# Patient Record
Sex: FEMALE | Race: OTHER | URBAN - METROPOLITAN AREA
[De-identification: names, ages, dates, MRNs, and addresses within clinical notes are randomized per-mention and may not be internally consistent; named-entity substitution may affect disease eponyms.]

---

## 2017-12-25 ENCOUNTER — EMERGENCY (EMERGENCY)
Facility: HOSPITAL | Age: 67
LOS: 1 days | Discharge: HOME | End: 2017-12-25
Attending: EMERGENCY MEDICINE | Admitting: EMERGENCY MEDICINE
Payer: SELF-PAY

## 2017-12-25 VITALS
WEIGHT: 207.01 LBS | OXYGEN SATURATION: 99 % | RESPIRATION RATE: 18 BRPM | HEIGHT: 65 IN | HEART RATE: 83 BPM | SYSTOLIC BLOOD PRESSURE: 162 MMHG | TEMPERATURE: 98 F | DIASTOLIC BLOOD PRESSURE: 79 MMHG

## 2017-12-25 LAB
ALBUMIN SERPL ELPH-MCNC: 4.6 G/DL — SIGNIFICANT CHANGE UP (ref 3.3–5.2)
ALP SERPL-CCNC: 96 U/L — SIGNIFICANT CHANGE UP (ref 40–120)
ALT FLD-CCNC: 38 U/L — HIGH
ANION GAP SERPL CALC-SCNC: 16 MMOL/L — SIGNIFICANT CHANGE UP (ref 5–17)
AST SERPL-CCNC: 26 U/L — SIGNIFICANT CHANGE UP
BASOPHILS # BLD AUTO: 0 K/UL — SIGNIFICANT CHANGE UP (ref 0–0.2)
BASOPHILS NFR BLD AUTO: 0.1 % — SIGNIFICANT CHANGE UP (ref 0–2)
BILIRUB SERPL-MCNC: 0.2 MG/DL — LOW (ref 0.4–2)
BUN SERPL-MCNC: 28 MG/DL — HIGH (ref 8–20)
CALCIUM SERPL-MCNC: 10.3 MG/DL — HIGH (ref 8.6–10.2)
CHLORIDE SERPL-SCNC: 96 MMOL/L — LOW (ref 98–107)
CK MB CFR SERPL CALC: 3.7 NG/ML — SIGNIFICANT CHANGE UP (ref 0–6.7)
CK SERPL-CCNC: 199 U/L — HIGH (ref 25–170)
CO2 SERPL-SCNC: 27 MMOL/L — SIGNIFICANT CHANGE UP (ref 22–29)
CREAT SERPL-MCNC: 1.35 MG/DL — HIGH (ref 0.5–1.3)
EOSINOPHIL # BLD AUTO: 0.1 K/UL — SIGNIFICANT CHANGE UP (ref 0–0.5)
EOSINOPHIL NFR BLD AUTO: 1.9 % — SIGNIFICANT CHANGE UP (ref 0–6)
GLUCOSE SERPL-MCNC: 195 MG/DL — HIGH (ref 70–115)
HCT VFR BLD CALC: 39.1 % — SIGNIFICANT CHANGE UP (ref 37–47)
HGB BLD-MCNC: 13.3 G/DL — SIGNIFICANT CHANGE UP (ref 12–16)
LYMPHOCYTES # BLD AUTO: 2.4 K/UL — SIGNIFICANT CHANGE UP (ref 1–4.8)
LYMPHOCYTES # BLD AUTO: 35.6 % — SIGNIFICANT CHANGE UP (ref 20–55)
MCHC RBC-ENTMCNC: 29 PG — SIGNIFICANT CHANGE UP (ref 27–31)
MCHC RBC-ENTMCNC: 34 G/DL — SIGNIFICANT CHANGE UP (ref 32–36)
MCV RBC AUTO: 85.4 FL — SIGNIFICANT CHANGE UP (ref 81–99)
MONOCYTES # BLD AUTO: 0.6 K/UL — SIGNIFICANT CHANGE UP (ref 0–0.8)
MONOCYTES NFR BLD AUTO: 8.6 % — SIGNIFICANT CHANGE UP (ref 3–10)
NEUTROPHILS # BLD AUTO: 3.7 K/UL — SIGNIFICANT CHANGE UP (ref 1.8–8)
NEUTROPHILS NFR BLD AUTO: 53.7 % — SIGNIFICANT CHANGE UP (ref 37–73)
PLATELET # BLD AUTO: 344 K/UL — SIGNIFICANT CHANGE UP (ref 150–400)
POTASSIUM SERPL-MCNC: 4.2 MMOL/L — SIGNIFICANT CHANGE UP (ref 3.5–5.3)
POTASSIUM SERPL-SCNC: 4.2 MMOL/L — SIGNIFICANT CHANGE UP (ref 3.5–5.3)
PROT SERPL-MCNC: 8.1 G/DL — SIGNIFICANT CHANGE UP (ref 6.6–8.7)
RBC # BLD: 4.58 M/UL — SIGNIFICANT CHANGE UP (ref 4.4–5.2)
RBC # FLD: 12.6 % — SIGNIFICANT CHANGE UP (ref 11–15.6)
SODIUM SERPL-SCNC: 139 MMOL/L — SIGNIFICANT CHANGE UP (ref 135–145)
T4 AB SER-ACNC: 7.5 UG/DL — SIGNIFICANT CHANGE UP (ref 4.5–12)
TROPONIN T SERPL-MCNC: <0.01 NG/ML — SIGNIFICANT CHANGE UP (ref 0–0.06)
TSH SERPL-MCNC: 2.16 UIU/ML — SIGNIFICANT CHANGE UP (ref 0.27–4.2)
WBC # BLD: 6.9 K/UL — SIGNIFICANT CHANGE UP (ref 4.8–10.8)
WBC # FLD AUTO: 6.9 K/UL — SIGNIFICANT CHANGE UP (ref 4.8–10.8)

## 2017-12-25 PROCEDURE — 84436 ASSAY OF TOTAL THYROXINE: CPT

## 2017-12-25 PROCEDURE — 99284 EMERGENCY DEPT VISIT MOD MDM: CPT | Mod: 25

## 2017-12-25 PROCEDURE — 93010 ELECTROCARDIOGRAM REPORT: CPT

## 2017-12-25 PROCEDURE — 85027 COMPLETE CBC AUTOMATED: CPT

## 2017-12-25 PROCEDURE — 82553 CREATINE MB FRACTION: CPT

## 2017-12-25 PROCEDURE — 84484 ASSAY OF TROPONIN QUANT: CPT

## 2017-12-25 PROCEDURE — 71046 X-RAY EXAM CHEST 2 VIEWS: CPT

## 2017-12-25 PROCEDURE — 93005 ELECTROCARDIOGRAM TRACING: CPT

## 2017-12-25 PROCEDURE — 71020: CPT | Mod: 26

## 2017-12-25 PROCEDURE — 82550 ASSAY OF CK (CPK): CPT

## 2017-12-25 PROCEDURE — 70450 CT HEAD/BRAIN W/O DYE: CPT

## 2017-12-25 PROCEDURE — 36415 COLL VENOUS BLD VENIPUNCTURE: CPT

## 2017-12-25 PROCEDURE — 70450 CT HEAD/BRAIN W/O DYE: CPT | Mod: 26

## 2017-12-25 PROCEDURE — 99285 EMERGENCY DEPT VISIT HI MDM: CPT

## 2017-12-25 PROCEDURE — 80053 COMPREHEN METABOLIC PANEL: CPT

## 2017-12-25 PROCEDURE — 84443 ASSAY THYROID STIM HORMONE: CPT

## 2017-12-25 NOTE — ED PROVIDER NOTE - NEUROLOGICAL, MLM
Alert and oriented, no focal deficits, no motor or sensory deficits. Full sensory to light touch, muscle strength 5/5 CN II-XII intact.

## 2017-12-25 NOTE — ED ADULT NURSE NOTE - OBJECTIVE STATEMENT
Assumed pt care @ 2130. Pt received sitting on stretcher in NAD. Pt AOx3 C/O left arm numbness and lower lip numbness that started @ 0730 and lasted for about 10 mins. Pt reports taking an asa unsure if it was a baby or full asa. Pt was seen @ urgent care @ 0900 and told to come fo CT but just came now.  Sensory equal B/L. Pt has a h/o DM and HTN. Lungs CTA, RR even unlabored. Skin warm, dry, color appropriate for age and race.

## 2017-12-25 NOTE — ED PROVIDER NOTE - OBJECTIVE STATEMENT
This is a 66 year old female with PMHx HTN, DM presenting to the ED c/o numbness to LUE and L lower lip today. Pt states she experienced her sx around 0730, sx subsided after ~10 minutes. She reports taking ASA 2-3 minutes before her sx resolved. Pt was evaluated at urgent care ~0900 today, instructed to seek ED evaluation r/o stroke however pt returned home to spend [place] with her family. Denies weakness, headache, chest pain, stroke, fever, chills. Non-smoker, no EtOH use. No further complaints at this time.

## 2017-12-25 NOTE — ED PROVIDER NOTE - PROGRESS NOTE DETAILS
CT results and labs as noted and reviewed with pt.  Pt stable for d/c with Neuro f/u as outpt.  Instructions and precautions reviewed

## 2017-12-25 NOTE — ED STATDOCS - PROGRESS NOTE DETAILS
67 y/o F pt with a hx of DM and HTN presents to the ED with c/o medical evaluation. Pt is experiencing numbness in her left arm and lip. She went to urgent care this morning (7:30 am) and was sent to the ED for possible stroke. She went home before coming to ED. Pt is currently taking Invokana, amlodipine, and other medications she can't recall. Pt states the numbness lasted a few minutes and she took an aspirin with relief. She states her sx have subsided but still "funny feeling in arm". Denies CP, SOB, HA. Focused eval, protocol orders entered. Pt to be moved to main ED for complete evaluation by another provider.

## 2019-05-25 PROBLEM — E11.9 TYPE 2 DIABETES MELLITUS WITHOUT COMPLICATIONS: Chronic | Status: ACTIVE | Noted: 2017-12-25

## 2019-05-25 PROBLEM — I10 ESSENTIAL (PRIMARY) HYPERTENSION: Chronic | Status: ACTIVE | Noted: 2017-12-25

## 2019-06-01 ENCOUNTER — OUTPATIENT (OUTPATIENT)
Dept: OUTPATIENT SERVICES | Facility: HOSPITAL | Age: 69
LOS: 1 days | End: 2019-06-01
Payer: MEDICARE

## 2019-06-01 ENCOUNTER — APPOINTMENT (OUTPATIENT)
Dept: ULTRASOUND IMAGING | Facility: CLINIC | Age: 69
End: 2019-06-01
Payer: MEDICARE

## 2019-06-01 DIAGNOSIS — Z00.00 ENCOUNTER FOR GENERAL ADULT MEDICAL EXAMINATION WITHOUT ABNORMAL FINDINGS: ICD-10-CM

## 2019-06-01 PROCEDURE — 76536 US EXAM OF HEAD AND NECK: CPT

## 2019-06-01 PROCEDURE — 76536 US EXAM OF HEAD AND NECK: CPT | Mod: 26

## 2019-08-27 ENCOUNTER — APPOINTMENT (OUTPATIENT)
Dept: ENDOCRINOLOGY | Facility: CLINIC | Age: 69
End: 2019-08-27
Payer: MEDICARE

## 2019-08-27 VITALS
DIASTOLIC BLOOD PRESSURE: 72 MMHG | WEIGHT: 196 LBS | SYSTOLIC BLOOD PRESSURE: 140 MMHG | HEART RATE: 75 BPM | BODY MASS INDEX: 32.65 KG/M2 | HEIGHT: 65 IN

## 2019-08-27 DIAGNOSIS — Z86.39 PERSONAL HISTORY OF OTHER ENDOCRINE, NUTRITIONAL AND METABOLIC DISEASE: ICD-10-CM

## 2019-08-27 DIAGNOSIS — E55.9 VITAMIN D DEFICIENCY, UNSPECIFIED: ICD-10-CM

## 2019-08-27 DIAGNOSIS — Z87.19 PERSONAL HISTORY OF OTHER DISEASES OF THE DIGESTIVE SYSTEM: ICD-10-CM

## 2019-08-27 DIAGNOSIS — Z78.9 OTHER SPECIFIED HEALTH STATUS: ICD-10-CM

## 2019-08-27 DIAGNOSIS — Z86.79 PERSONAL HISTORY OF OTHER DISEASES OF THE CIRCULATORY SYSTEM: ICD-10-CM

## 2019-08-27 DIAGNOSIS — Z87.39 PERSONAL HISTORY OF OTHER DISEASES OF THE MUSCULOSKELETAL SYSTEM AND CONNECTIVE TISSUE: ICD-10-CM

## 2019-08-27 PROCEDURE — 99205 OFFICE O/P NEW HI 60 MIN: CPT

## 2019-08-27 RX ORDER — AMLODIPINE BESYLATE 5 MG/1
5 TABLET ORAL
Refills: 0 | Status: ACTIVE | COMMUNITY

## 2019-08-27 RX ORDER — ATORVASTATIN CALCIUM 20 MG/1
20 TABLET, FILM COATED ORAL
Refills: 0 | Status: ACTIVE | COMMUNITY

## 2019-08-27 RX ORDER — ASPIRIN 81 MG
81 TABLET, DELAYED RELEASE (ENTERIC COATED) ORAL
Refills: 0 | Status: ACTIVE | COMMUNITY

## 2019-08-27 RX ORDER — LOSARTAN POTASSIUM AND HYDROCHLOROTHIAZIDE 25; 100 MG/1; MG/1
100-25 TABLET ORAL
Refills: 0 | Status: ACTIVE | COMMUNITY

## 2019-08-27 NOTE — ASSESSMENT
[Carbohydrate Consistent Diet] : carbohydrate consistent diet [Diabetes Foot Care] : diabetes foot care [Long Term Vascular Complications] : long term vascular complications of diabetes [Importance of Diet and Exercise] : importance of diet and exercise to improve glycemic control, achieve weight loss and improve cardiovascular health [Insulin Self-Administration] : insulin self-administration [Self Monitoring of Blood Glucose] : self monitoring of blood glucose [FreeTextEntry1] : DM2 poorly controlled associated with HTN, HDL obesity and NTMNG. She seems to have significant memory impairment. She had falls . She feels " the brain does not communicate with her body". She does not check any bgs at home and she does not take the full doses of medication. \par \par DM2 : suboptimal control. \par increase metformin 500 mg BID \par continue glimepiride 4 mg qd \par glucometer, teaching, supplies. \par start checking bgs 2x day \par check microalb spot \par continue  ARB \par all lab results reviewed and discussed with patient. All questions answered\par discussed diet and exercise\par encouraged more exercise walking 30 min 3 x week\par Discussed complications of diabetes at length including MI/CVA/ESRD/dialysis/blindness/amputations/infections\par See CDE for diet teaching\par Needs to try to have more protein for meals\par eye exam referral \par fax me logbook in  2 week \par for this memory impairment she needs to see neurology to rule out TIA / CVA \par foot exam normal\par CKD with GFR 52. \par \par NTMNG : \par US report revised and results discussed with patient. \par Images reviewed and compared to prior study.\par had FNA benign JUne 2019 \par monitor nodules for growth with US in 6 mos \par \par HTN :  bp at target on meds. Continue current management.\par Discussed and advised low salt diet. \par continue current meds\par \par HLD: check lipids in 3 mos\par continue statin \par \par obesity: \par discussed diet and exercise\par encouraged more exercise walking 30 min 3 x week\par Discussed complications of diabetes at length including MI/CVA/ESRD/dialysis/blindness/amputations/infections\par Needs to try to have more protein for meals\par \par hypercalcemia: repeat CMp, ca ionized., PTHi, 25OHD, 1,25 diOHD \par will call with results. \par she takes HCTZ . she does not take any MVI

## 2019-08-27 NOTE — PHYSICAL EXAM
[Alert] : alert [No Acute Distress] : no acute distress [Well Nourished] : well nourished [Well Developed] : well developed [Normal Sclera/Conjunctiva] : normal sclera/conjunctiva [No Proptosis] : no proptosis [EOMI] : extra ocular movement intact [Normal Oropharynx] : the oropharynx was normal [Thyroid Not Enlarged] : the thyroid was not enlarged [No Thyroid Nodules] : there were no palpable thyroid nodules [No Respiratory Distress] : no respiratory distress [No Accessory Muscle Use] : no accessory muscle use [Clear to Auscultation] : lungs were clear to auscultation bilaterally [Normal Rate] : heart rate was normal  [Normal S1, S2] : normal S1 and S2 [Regular Rhythm] : with a regular rhythm [Pedal Pulses Normal] : the pedal pulses are present [No Edema] : there was no peripheral edema [Normal Bowel Sounds] : normal bowel sounds [Not Tender] : non-tender [Soft] : abdomen soft [Not Distended] : not distended [Post Cervical Nodes] : posterior cervical nodes [Anterior Cervical Nodes] : anterior cervical nodes [Axillary Nodes] : axillary nodes [Normal] : normal and non tender [No Spinal Tenderness] : no spinal tenderness [No Stigmata of Cushings Syndrome] : no stigmata of cushings syndrome [Spine Straight] : spine straight [Normal Gait] : normal gait [No Rash] : no rash [Normal Strength/Tone] : muscle strength and tone were normal [No Tremors] : no tremors [Normal Reflexes] : deep tendon reflexes were 2+ and symmetric [Oriented x3] : oriented to person, place, and time [Acanthosis Nigricans] : no acanthosis nigricans

## 2019-08-28 LAB
24R-OH-CALCIDIOL SERPL-MCNC: 34.6 PG/ML
ALBUMIN SERPL ELPH-MCNC: 4.7 G/DL
ALP BLD-CCNC: 79 U/L
ALT SERPL-CCNC: 26 U/L
ANION GAP SERPL CALC-SCNC: 14 MMOL/L
AST SERPL-CCNC: 21 U/L
BILIRUB SERPL-MCNC: 0.2 MG/DL
BUN SERPL-MCNC: 33 MG/DL
CA-I SERPL-SCNC: 1.34 MMOL/L
CALCIUM SERPL-MCNC: 10.8 MG/DL
CALCIUM SERPL-MCNC: 10.8 MG/DL
CHLORIDE SERPL-SCNC: 103 MMOL/L
CO2 SERPL-SCNC: 24 MMOL/L
CREAT SERPL-MCNC: 1.28 MG/DL
GLUCOSE SERPL-MCNC: 90 MG/DL
PARATHYROID HORMONE INTACT: 76 PG/ML
PHOSPHATE SERPL-MCNC: 3.6 MG/DL
POTASSIUM SERPL-SCNC: 4.4 MMOL/L
PROT SERPL-MCNC: 7.5 G/DL
SODIUM SERPL-SCNC: 141 MMOL/L

## 2019-09-03 ENCOUNTER — RX RENEWAL (OUTPATIENT)
Age: 69
End: 2019-09-03

## 2019-09-05 ENCOUNTER — MEDICATION RENEWAL (OUTPATIENT)
Age: 69
End: 2019-09-05

## 2019-09-05 RX ORDER — BLOOD-GLUCOSE METER
W/DEVICE KIT MISCELLANEOUS
Qty: 1 | Refills: 0 | Status: ACTIVE | COMMUNITY
Start: 2019-09-03 | End: 1900-01-01

## 2019-09-05 RX ORDER — LANCETS
EACH MISCELLANEOUS
Qty: 200 | Refills: 0 | Status: ACTIVE | COMMUNITY
Start: 2019-09-05 | End: 1900-01-01

## 2019-09-05 RX ORDER — BLOOD SUGAR DIAGNOSTIC
STRIP MISCELLANEOUS
Qty: 2 | Refills: 0 | Status: ACTIVE | COMMUNITY
Start: 2019-09-03 | End: 1900-01-01

## 2019-10-15 ENCOUNTER — APPOINTMENT (OUTPATIENT)
Dept: ENDOCRINOLOGY | Facility: CLINIC | Age: 69
End: 2019-10-15
Payer: MEDICARE

## 2019-10-15 PROCEDURE — 97802 MEDICAL NUTRITION INDIV IN: CPT

## 2019-10-16 LAB — HBA1C MFR BLD HPLC: 6

## 2019-12-10 ENCOUNTER — APPOINTMENT (OUTPATIENT)
Dept: ENDOCRINOLOGY | Facility: CLINIC | Age: 69
End: 2019-12-10

## 2020-03-16 ENCOUNTER — APPOINTMENT (OUTPATIENT)
Dept: ENDOCRINOLOGY | Facility: CLINIC | Age: 70
End: 2020-03-16
Payer: MEDICARE

## 2020-03-16 VITALS
DIASTOLIC BLOOD PRESSURE: 78 MMHG | WEIGHT: 179 LBS | SYSTOLIC BLOOD PRESSURE: 150 MMHG | BODY MASS INDEX: 29.82 KG/M2 | HEIGHT: 65 IN

## 2020-03-16 DIAGNOSIS — E78.00 PURE HYPERCHOLESTEROLEMIA, UNSPECIFIED: ICD-10-CM

## 2020-03-16 DIAGNOSIS — E11.65 TYPE 2 DIABETES MELLITUS WITH HYPERGLYCEMIA: ICD-10-CM

## 2020-03-16 DIAGNOSIS — E21.3 HYPERPARATHYROIDISM, UNSPECIFIED: ICD-10-CM

## 2020-03-16 DIAGNOSIS — Z79.84 LONG TERM (CURRENT) USE OF ORAL HYPOGLYCEMIC DRUGS: ICD-10-CM

## 2020-03-16 DIAGNOSIS — I10 ESSENTIAL (PRIMARY) HYPERTENSION: ICD-10-CM

## 2020-03-16 DIAGNOSIS — E04.2 NONTOXIC MULTINODULAR GOITER: ICD-10-CM

## 2020-03-16 DIAGNOSIS — E66.9 OBESITY, UNSPECIFIED: ICD-10-CM

## 2020-03-16 DIAGNOSIS — E83.52 HYPERCALCEMIA: ICD-10-CM

## 2020-03-16 LAB — HBA1C MFR BLD HPLC: 6.5

## 2020-03-16 PROCEDURE — 83036 HEMOGLOBIN GLYCOSYLATED A1C: CPT | Mod: QW

## 2020-03-16 PROCEDURE — 99214 OFFICE O/P EST MOD 30 MIN: CPT | Mod: 25

## 2020-03-16 RX ORDER — HYDROCHLOROTHIAZIDE 25 MG/1
25 TABLET ORAL
Refills: 0 | Status: ACTIVE | COMMUNITY

## 2020-03-16 RX ORDER — GLIMEPIRIDE 4 MG/1
4 TABLET ORAL
Refills: 0 | Status: DISCONTINUED | COMMUNITY
End: 2020-03-16

## 2020-03-16 RX ORDER — AMLODIPINE BESYLATE 5 MG/1
5 TABLET ORAL
Refills: 0 | Status: DISCONTINUED | COMMUNITY
End: 2020-03-16

## 2020-03-16 RX ORDER — METFORMIN HYDROCHLORIDE 500 MG/1
500 TABLET, COATED ORAL
Refills: 0 | Status: ACTIVE | COMMUNITY

## 2020-03-16 NOTE — PHYSICAL EXAM
[Alert] : alert [No Acute Distress] : no acute distress [Well Nourished] : well nourished [Well Developed] : well developed [Normal Sclera/Conjunctiva] : normal sclera/conjunctiva [EOMI] : extra ocular movement intact [No Proptosis] : no proptosis [Normal Oropharynx] : the oropharynx was normal [Thyroid Not Enlarged] : the thyroid was not enlarged [No Thyroid Nodules] : there were no palpable thyroid nodules [No Respiratory Distress] : no respiratory distress [No Accessory Muscle Use] : no accessory muscle use [Clear to Auscultation] : lungs were clear to auscultation bilaterally [Normal Rate] : heart rate was normal  [Normal S1, S2] : normal S1 and S2 [Regular Rhythm] : with a regular rhythm [Pedal Pulses Normal] : the pedal pulses are present [No Edema] : there was no peripheral edema [Normal Bowel Sounds] : normal bowel sounds [Not Tender] : non-tender [Soft] : abdomen soft [Not Distended] : not distended [Post Cervical Nodes] : posterior cervical nodes [Anterior Cervical Nodes] : anterior cervical nodes [Axillary Nodes] : axillary nodes [No Spinal Tenderness] : no spinal tenderness [Spine Straight] : spine straight [No Stigmata of Cushings Syndrome] : no stigmata of cushings syndrome [Normal Gait] : normal gait [Normal Strength/Tone] : muscle strength and tone were normal [No Rash] : no rash [Normal Reflexes] : deep tendon reflexes were 2+ and symmetric [No Tremors] : no tremors [Oriented x3] : oriented to person, place, and time [Normal] : normal [Full ROM] : with full range of motion [Acanthosis Nigricans] : no acanthosis nigricans [Diminished Throughout Both Feet] : normal tactile sensation with monofilament testing throughout both feet

## 2020-03-16 NOTE — ASSESSMENT
[Carbohydrate Consistent Diet] : carbohydrate consistent diet [Importance of Diet and Exercise] : importance of diet and exercise to improve glycemic control, achieve weight loss and improve cardiovascular health [Self Monitoring of Blood Glucose] : self monitoring of blood glucose [FreeTextEntry1] : DM2 in patient with  HTN, HDL obesity and NTMNG. She seems to have significant memory impairment.  \par bgs am 115-120.   A1c 6.5\par \par DM2 : better controlled \par continue metformin 500 mg BID \par continue glimepiride 4 mg qd \par checking bgs 2x day \par microalb spot normal. \par all lab results reviewed and discussed with patient. All questions answered\par discussed diet and exercise\par e just had meniscal repair and she does not exercise \par encouraged to start walking.\par Discussed complications of diabetes at length including MI/CVA/ESRD/dialysis/blindness/amputations/infections\par eye exam referral again \par for this memory impairment she had neurology evaluation and she had CVA in the past.\par CKD with GFR 56. \par continue ARB \par feet exam normal vibratory and monofilament. \par \par NTMNG : \par Images reviewed and compared to prior study.\par had FNA benign JUne 2019 . repeat US now to evaluate nodules for growth.  \par \par HTN :  BP slightly high today. BUt she forgot to take all meds today. Strongly advised to take all of them daily. \par I advised low fat/low cholesterol diet, low salt diet, and weight loss\par continue current meds\par \par HLD: LDL at target.\par low fat/low cholesterol diet and weight loss advised\par continue statin \par \par obesity: \par encouraged more exercise walking 30 min 3 x week\par needs to try to have more protein for meals\par \par hypercalcemia  PTH-driven  \par Corrected calcium 10.1.. \par PTH i high 76. \par she takes HCTZ . Advised to call pCP and stop HCTZ and adjust BP medication. \par will repeat Calcium 4 weeks after stopping hctz

## 2020-06-10 ENCOUNTER — LABORATORY RESULT (OUTPATIENT)
Age: 70
End: 2020-06-10

## 2020-06-11 ENCOUNTER — APPOINTMENT (OUTPATIENT)
Dept: ENDOCRINOLOGY | Facility: CLINIC | Age: 70
End: 2020-06-11

## 2020-10-29 ENCOUNTER — APPOINTMENT (OUTPATIENT)
Dept: ENDOCRINOLOGY | Facility: CLINIC | Age: 70
End: 2020-10-29

## 2022-04-24 NOTE — REVIEW OF SYSTEMS
Implemented All Universal Safety Interventions:  [place] to call system. Call bell, personal items and telephone within reach. Instruct patient to call for assistance. Room bathroom lighting operational. Non-slip footwear when patient is off stretcher. Physically safe environment: no spills, clutter or unnecessary equipment. Stretcher in lowest position, wheels locked, appropriate side rails in place.
[Negative] : Heme/Lymph

## 2023-03-14 ENCOUNTER — OFFICE (OUTPATIENT)
Dept: URBAN - METROPOLITAN AREA CLINIC 104 | Facility: CLINIC | Age: 73
Setting detail: OPHTHALMOLOGY
End: 2023-03-14
Payer: MEDICARE

## 2023-03-14 DIAGNOSIS — Z96.1: ICD-10-CM

## 2023-03-14 DIAGNOSIS — H26.491: ICD-10-CM

## 2023-03-14 DIAGNOSIS — E11.9: ICD-10-CM

## 2023-03-14 DIAGNOSIS — H43.813: ICD-10-CM

## 2023-03-14 DIAGNOSIS — H40.013: ICD-10-CM

## 2023-03-14 PROCEDURE — 99213 OFFICE O/P EST LOW 20 MIN: CPT | Performed by: SPECIALIST

## 2023-03-14 PROCEDURE — 92081 LIMITED VISUAL FIELD XM: CPT | Performed by: SPECIALIST

## 2023-03-14 ASSESSMENT — KERATOMETRY
OD_K1POWER_DIOPTERS: 45.70
OD_AXISANGLE_DEGREES: 133
OS_K1POWER_DIOPTERS: 45.06
OS_K2POWER_DIOPTERS: 45.24
OD_K2POWER_DIOPTERS: 45.67
OS_AXISANGLE_DEGREES: 071

## 2023-03-14 ASSESSMENT — PACHYMETRY
OD_CT_UM: 573
OS_CT_CORRECTION: -2
OD_CT_CORRECTION: -2
OS_CT_UM: 574

## 2023-03-14 ASSESSMENT — REFRACTION_MANIFEST
OS_CYLINDER: SPH
OD_SPHERE: PLANO
OD_ADD: +2.50
OD_VA1: 20/20
OS_SPHERE: -1.25
OD_VA2: 20/20(J1+)
OS_VA1: 20/25
OS_VA2: 20/20(J1+)
OS_ADD: +2.50

## 2023-03-14 ASSESSMENT — TONOMETRY
OD_IOP_MMHG: 17
OS_IOP_MMHG: 17

## 2023-03-14 ASSESSMENT — SPHEQUIV_DERIVED
OS_SPHEQUIV: -1.375
OD_SPHEQUIV: 0.125

## 2023-03-14 ASSESSMENT — REFRACTION_AUTOREFRACTION
OD_SPHERE: +0.50
OS_SPHERE: -1.25
OD_CYLINDER: -0.75
OS_CYLINDER: -0.25
OS_AXIS: 005
OD_AXIS: 087

## 2023-03-14 ASSESSMENT — CONFRONTATIONAL VISUAL FIELD TEST (CVF)
OS_FINDINGS: FULL
OD_FINDINGS: FULL

## 2023-03-14 ASSESSMENT — VISUAL ACUITY
OD_BCVA: 20/20
OS_BCVA: 20/20

## 2023-03-14 ASSESSMENT — AXIALLENGTH_DERIVED
OS_AL: 23.52
OD_AL: 22.7703

## 2023-10-17 ENCOUNTER — OFFICE (OUTPATIENT)
Dept: URBAN - METROPOLITAN AREA CLINIC 104 | Facility: CLINIC | Age: 73
Setting detail: OPHTHALMOLOGY
End: 2023-10-17
Payer: MEDICARE

## 2023-10-17 DIAGNOSIS — H43.813: ICD-10-CM

## 2023-10-17 DIAGNOSIS — Z96.1: ICD-10-CM

## 2023-10-17 DIAGNOSIS — E11.9: ICD-10-CM

## 2023-10-17 DIAGNOSIS — H26.491: ICD-10-CM

## 2023-10-17 DIAGNOSIS — H40.013: ICD-10-CM

## 2023-10-17 PROCEDURE — 92133 CPTRZD OPH DX IMG PST SGM ON: CPT | Performed by: SPECIALIST

## 2023-10-17 PROCEDURE — 92014 COMPRE OPH EXAM EST PT 1/>: CPT | Performed by: SPECIALIST

## 2023-10-17 ASSESSMENT — KERATOMETRY
OS_K2POWER_DIOPTERS: 45.30
OS_AXISANGLE_DEGREES: 097
OD_K1POWER_DIOPTERS: 44.35
OD_K2POWER_DIOPTERS: 45.61
OD_AXISANGLE_DEGREES: 157
OS_K1POWER_DIOPTERS: 44.70

## 2023-10-17 ASSESSMENT — CONFRONTATIONAL VISUAL FIELD TEST (CVF)
OS_FINDINGS: FULL
OD_FINDINGS: FULL

## 2023-10-17 ASSESSMENT — REFRACTION_AUTOREFRACTION
OD_SPHERE: PLANO
OD_AXIS: 036
OS_CYLINDER: -0.25
OS_SPHERE: -0.75
OS_AXIS: 150
OD_CYLINDER: -0.75

## 2023-10-17 ASSESSMENT — PACHYMETRY
OD_CT_CORRECTION: -2
OD_CT_UM: 573
OS_CT_CORRECTION: -2
OS_CT_UM: 574

## 2023-10-17 ASSESSMENT — TONOMETRY
OD_IOP_MMHG: 16
OS_IOP_MMHG: 16

## 2023-10-17 ASSESSMENT — AXIALLENGTH_DERIVED: OS_AL: 23.3837

## 2023-10-17 ASSESSMENT — VISUAL ACUITY
OD_BCVA: 20/30-2
OS_BCVA: 20/20

## 2023-10-17 ASSESSMENT — SPHEQUIV_DERIVED: OS_SPHEQUIV: -0.875

## 2024-04-23 ENCOUNTER — OFFICE (OUTPATIENT)
Dept: URBAN - METROPOLITAN AREA CLINIC 104 | Facility: CLINIC | Age: 74
Setting detail: OPHTHALMOLOGY
End: 2024-04-23
Payer: MEDICARE

## 2024-04-23 DIAGNOSIS — E11.9: ICD-10-CM

## 2024-04-23 DIAGNOSIS — Z96.1: ICD-10-CM

## 2024-04-23 DIAGNOSIS — H40.013: ICD-10-CM

## 2024-04-23 DIAGNOSIS — H26.491: ICD-10-CM

## 2024-04-23 DIAGNOSIS — H43.813: ICD-10-CM

## 2024-04-23 PROCEDURE — 92083 EXTENDED VISUAL FIELD XM: CPT | Performed by: SPECIALIST

## 2024-04-23 PROCEDURE — 99213 OFFICE O/P EST LOW 20 MIN: CPT | Performed by: SPECIALIST

## 2024-10-29 ENCOUNTER — OFFICE (OUTPATIENT)
Dept: URBAN - METROPOLITAN AREA CLINIC 104 | Facility: CLINIC | Age: 74
Setting detail: OPHTHALMOLOGY
End: 2024-10-29
Payer: MEDICARE

## 2024-10-29 DIAGNOSIS — E11.9: ICD-10-CM

## 2024-10-29 DIAGNOSIS — Z96.1: ICD-10-CM

## 2024-10-29 DIAGNOSIS — H40.013: ICD-10-CM

## 2024-10-29 DIAGNOSIS — H43.813: ICD-10-CM

## 2024-10-29 DIAGNOSIS — H26.491: ICD-10-CM

## 2024-10-29 PROCEDURE — 92014 COMPRE OPH EXAM EST PT 1/>: CPT | Performed by: SPECIALIST

## 2024-10-29 PROCEDURE — 92133 CPTRZD OPH DX IMG PST SGM ON: CPT | Performed by: SPECIALIST

## 2024-10-29 ASSESSMENT — TONOMETRY
OS_IOP_MMHG: 15
OD_IOP_MMHG: 15

## 2024-10-29 ASSESSMENT — VISUAL ACUITY
OS_BCVA: 20/30
OD_BCVA: 20/30-1

## 2024-10-29 ASSESSMENT — REFRACTION_AUTOREFRACTION
OD_CYLINDER: -0.75
OD_AXIS: 76
OS_AXIS: 24
OD_SPHERE: +0.75
OS_CYLINDER: -0.25
OS_SPHERE: -1.25

## 2024-10-29 ASSESSMENT — CONFRONTATIONAL VISUAL FIELD TEST (CVF)
OD_FINDINGS: FULL
OS_FINDINGS: FULL

## 2024-10-29 ASSESSMENT — PACHYMETRY
OS_CT_CORRECTION: -2
OD_CT_CORRECTION: -2
OD_CT_UM: 573
OS_CT_UM: 574

## 2024-12-17 ENCOUNTER — INPATIENT (INPATIENT)
Facility: HOSPITAL | Age: 74
LOS: 2 days | Discharge: HOME | DRG: 286 | End: 2024-12-20
Attending: STUDENT IN AN ORGANIZED HEALTH CARE EDUCATION/TRAINING PROGRAM | Admitting: FAMILY MEDICINE
Payer: MEDICARE

## 2024-12-17 ENCOUNTER — RESULT REVIEW (OUTPATIENT)
Age: 74
End: 2024-12-17

## 2024-12-17 VITALS
WEIGHT: 203.93 LBS | RESPIRATION RATE: 20 BRPM | DIASTOLIC BLOOD PRESSURE: 74 MMHG | TEMPERATURE: 98 F | HEART RATE: 68 BPM | SYSTOLIC BLOOD PRESSURE: 180 MMHG | OXYGEN SATURATION: 98 %

## 2024-12-17 DIAGNOSIS — I50.9 HEART FAILURE, UNSPECIFIED: ICD-10-CM

## 2024-12-17 LAB
ALBUMIN SERPL ELPH-MCNC: 4.3 G/DL — SIGNIFICANT CHANGE UP (ref 3.3–5.2)
ALP SERPL-CCNC: 65 U/L — SIGNIFICANT CHANGE UP (ref 40–120)
ALT FLD-CCNC: 17 U/L — SIGNIFICANT CHANGE UP
ANION GAP SERPL CALC-SCNC: 16 MMOL/L — SIGNIFICANT CHANGE UP (ref 5–17)
APPEARANCE UR: CLEAR — SIGNIFICANT CHANGE UP
APTT BLD: 33.7 SEC — SIGNIFICANT CHANGE UP (ref 24.5–35.6)
APTT BLD: 83.9 SEC — HIGH (ref 24.5–35.6)
AST SERPL-CCNC: 25 U/L — SIGNIFICANT CHANGE UP
BASOPHILS # BLD AUTO: 0.02 K/UL — SIGNIFICANT CHANGE UP (ref 0–0.2)
BASOPHILS NFR BLD AUTO: 0.3 % — SIGNIFICANT CHANGE UP (ref 0–2)
BILIRUB SERPL-MCNC: 0.4 MG/DL — SIGNIFICANT CHANGE UP (ref 0.4–2)
BILIRUB UR-MCNC: NEGATIVE — SIGNIFICANT CHANGE UP
BUN SERPL-MCNC: 53.2 MG/DL — HIGH (ref 8–20)
CALCIUM SERPL-MCNC: 9.5 MG/DL — SIGNIFICANT CHANGE UP (ref 8.4–10.5)
CHLORIDE SERPL-SCNC: 103 MMOL/L — SIGNIFICANT CHANGE UP (ref 96–108)
CO2 SERPL-SCNC: 19 MMOL/L — LOW (ref 22–29)
COLOR SPEC: YELLOW — SIGNIFICANT CHANGE UP
CREAT SERPL-MCNC: 2.81 MG/DL — HIGH (ref 0.5–1.3)
DIFF PNL FLD: NEGATIVE — SIGNIFICANT CHANGE UP
EGFR: 17 ML/MIN/1.73M2 — LOW
EOSINOPHIL # BLD AUTO: 0.07 K/UL — SIGNIFICANT CHANGE UP (ref 0–0.5)
EOSINOPHIL NFR BLD AUTO: 1.1 % — SIGNIFICANT CHANGE UP (ref 0–6)
FLUAV AG NPH QL: SIGNIFICANT CHANGE UP
FLUBV AG NPH QL: SIGNIFICANT CHANGE UP
GLUCOSE BLDC GLUCOMTR-MCNC: 193 MG/DL — HIGH (ref 70–99)
GLUCOSE SERPL-MCNC: 91 MG/DL — SIGNIFICANT CHANGE UP (ref 70–99)
GLUCOSE UR QL: NEGATIVE MG/DL — SIGNIFICANT CHANGE UP
HCT VFR BLD CALC: 27.1 % — LOW (ref 34.5–45)
HCT VFR BLD CALC: 28.2 % — LOW (ref 34.5–45)
HGB BLD-MCNC: 8.7 G/DL — LOW (ref 11.5–15.5)
HGB BLD-MCNC: 9.2 G/DL — LOW (ref 11.5–15.5)
IMM GRANULOCYTES NFR BLD AUTO: 0.5 % — SIGNIFICANT CHANGE UP (ref 0–0.9)
INR BLD: 1.92 RATIO — HIGH (ref 0.85–1.16)
KETONES UR-MCNC: NEGATIVE MG/DL — SIGNIFICANT CHANGE UP
LEUKOCYTE ESTERASE UR-ACNC: NEGATIVE — SIGNIFICANT CHANGE UP
LYMPHOCYTES # BLD AUTO: 1.34 K/UL — SIGNIFICANT CHANGE UP (ref 1–3.3)
LYMPHOCYTES # BLD AUTO: 21.9 % — SIGNIFICANT CHANGE UP (ref 13–44)
MCHC RBC-ENTMCNC: 29.2 PG — SIGNIFICANT CHANGE UP (ref 27–34)
MCHC RBC-ENTMCNC: 29.4 PG — SIGNIFICANT CHANGE UP (ref 27–34)
MCHC RBC-ENTMCNC: 32.1 G/DL — SIGNIFICANT CHANGE UP (ref 32–36)
MCHC RBC-ENTMCNC: 32.6 G/DL — SIGNIFICANT CHANGE UP (ref 32–36)
MCV RBC AUTO: 89.5 FL — SIGNIFICANT CHANGE UP (ref 80–100)
MCV RBC AUTO: 91.6 FL — SIGNIFICANT CHANGE UP (ref 80–100)
MONOCYTES # BLD AUTO: 0.52 K/UL — SIGNIFICANT CHANGE UP (ref 0–0.9)
MONOCYTES NFR BLD AUTO: 8.5 % — SIGNIFICANT CHANGE UP (ref 2–14)
NEUTROPHILS # BLD AUTO: 4.14 K/UL — SIGNIFICANT CHANGE UP (ref 1.8–7.4)
NEUTROPHILS NFR BLD AUTO: 67.7 % — SIGNIFICANT CHANGE UP (ref 43–77)
NITRITE UR-MCNC: NEGATIVE — SIGNIFICANT CHANGE UP
NT-PROBNP SERPL-SCNC: 1917 PG/ML — HIGH (ref 0–300)
PH UR: 6 — SIGNIFICANT CHANGE UP (ref 5–8)
PLATELET # BLD AUTO: 312 K/UL — SIGNIFICANT CHANGE UP (ref 150–400)
PLATELET # BLD AUTO: 340 K/UL — SIGNIFICANT CHANGE UP (ref 150–400)
POTASSIUM SERPL-MCNC: 4.3 MMOL/L — SIGNIFICANT CHANGE UP (ref 3.5–5.3)
POTASSIUM SERPL-SCNC: 4.3 MMOL/L — SIGNIFICANT CHANGE UP (ref 3.5–5.3)
PROT SERPL-MCNC: 7.2 G/DL — SIGNIFICANT CHANGE UP (ref 6.6–8.7)
PROT UR-MCNC: 300 MG/DL
PROTHROM AB SERPL-ACNC: 22.1 SEC — HIGH (ref 9.9–13.4)
RBC # BLD: 2.96 M/UL — LOW (ref 3.8–5.2)
RBC # BLD: 3.15 M/UL — LOW (ref 3.8–5.2)
RBC # FLD: 13.1 % — SIGNIFICANT CHANGE UP (ref 10.3–14.5)
RBC # FLD: 13.2 % — SIGNIFICANT CHANGE UP (ref 10.3–14.5)
RSV RNA NPH QL NAA+NON-PROBE: SIGNIFICANT CHANGE UP
SARS-COV-2 RNA SPEC QL NAA+PROBE: SIGNIFICANT CHANGE UP
SODIUM SERPL-SCNC: 138 MMOL/L — SIGNIFICANT CHANGE UP (ref 135–145)
SP GR SPEC: 1.01 — SIGNIFICANT CHANGE UP (ref 1–1.03)
TROPONIN T, HIGH SENSITIVITY RESULT: 21 NG/L — SIGNIFICANT CHANGE UP (ref 0–51)
TROPONIN T, HIGH SENSITIVITY RESULT: 23 NG/L — SIGNIFICANT CHANGE UP (ref 0–51)
UROBILINOGEN FLD QL: 0.2 MG/DL — SIGNIFICANT CHANGE UP (ref 0.2–1)
WBC # BLD: 4.87 K/UL — SIGNIFICANT CHANGE UP (ref 3.8–10.5)
WBC # BLD: 6.12 K/UL — SIGNIFICANT CHANGE UP (ref 3.8–10.5)
WBC # FLD AUTO: 4.87 K/UL — SIGNIFICANT CHANGE UP (ref 3.8–10.5)
WBC # FLD AUTO: 6.12 K/UL — SIGNIFICANT CHANGE UP (ref 3.8–10.5)

## 2024-12-17 PROCEDURE — 71045 X-RAY EXAM CHEST 1 VIEW: CPT | Mod: 26

## 2024-12-17 PROCEDURE — 99223 1ST HOSP IP/OBS HIGH 75: CPT

## 2024-12-17 PROCEDURE — 93010 ELECTROCARDIOGRAM REPORT: CPT

## 2024-12-17 PROCEDURE — 99497 ADVNCD CARE PLAN 30 MIN: CPT | Mod: 25

## 2024-12-17 PROCEDURE — 93306 TTE W/DOPPLER COMPLETE: CPT | Mod: 26

## 2024-12-17 PROCEDURE — 99285 EMERGENCY DEPT VISIT HI MDM: CPT | Mod: GC

## 2024-12-17 RX ORDER — ISOSORBIDE MONONITRATE 10 MG
60 TABLET ORAL DAILY
Refills: 0 | Status: DISCONTINUED | OUTPATIENT
Start: 2024-12-17 | End: 2024-12-20

## 2024-12-17 RX ORDER — FUROSEMIDE 40 MG/1
40 TABLET ORAL DAILY
Refills: 0 | Status: DISCONTINUED | OUTPATIENT
Start: 2024-12-17 | End: 2024-12-19

## 2024-12-17 RX ORDER — 0.9 % SODIUM CHLORIDE 0.9 %
1000 INTRAVENOUS SOLUTION INTRAVENOUS
Refills: 0 | Status: DISCONTINUED | OUTPATIENT
Start: 2024-12-17 | End: 2024-12-20

## 2024-12-17 RX ORDER — ONDANSETRON HYDROCHLORIDE 4 MG/1
4 TABLET, FILM COATED ORAL EVERY 8 HOURS
Refills: 0 | Status: DISCONTINUED | OUTPATIENT
Start: 2024-12-17 | End: 2024-12-20

## 2024-12-17 RX ORDER — METOPROLOL TARTRATE 100 MG/1
100 TABLET, FILM COATED ORAL DAILY
Refills: 0 | Status: DISCONTINUED | OUTPATIENT
Start: 2024-12-17 | End: 2024-12-17

## 2024-12-17 RX ORDER — HEPARIN SODIUM,PORCINE 1000/ML
VIAL (ML) INJECTION
Qty: 25000 | Refills: 0 | Status: DISCONTINUED | OUTPATIENT
Start: 2024-12-17 | End: 2024-12-18

## 2024-12-17 RX ORDER — ACETAMINOPHEN 500MG 500 MG/1
650 TABLET, COATED ORAL EVERY 6 HOURS
Refills: 0 | Status: DISCONTINUED | OUTPATIENT
Start: 2024-12-17 | End: 2024-12-20

## 2024-12-17 RX ORDER — ALBUTEROL 90 MCG
2.5 AEROSOL (GRAM) INHALATION EVERY 6 HOURS
Refills: 0 | Status: DISCONTINUED | OUTPATIENT
Start: 2024-12-17 | End: 2024-12-18

## 2024-12-17 RX ORDER — ERGOCALCIFEROL (VITAMIN D2) 200 MCG/ML
50000 DROPS ORAL
Refills: 0 | Status: DISCONTINUED | OUTPATIENT
Start: 2024-12-17 | End: 2024-12-20

## 2024-12-17 RX ORDER — HYDRALAZINE HYDROCHLORIDE 10 MG/1
50 TABLET ORAL THREE TIMES A DAY
Refills: 0 | Status: DISCONTINUED | OUTPATIENT
Start: 2024-12-17 | End: 2024-12-20

## 2024-12-17 RX ORDER — ACETAMINOPHEN, DIPHENHYDRAMINE HCL, PHENYLEPHRINE HCL 325; 25; 5 MG/1; MG/1; MG/1
3 TABLET ORAL AT BEDTIME
Refills: 0 | Status: DISCONTINUED | OUTPATIENT
Start: 2024-12-17 | End: 2024-12-20

## 2024-12-17 RX ORDER — HEPARIN SODIUM,PORCINE 1000/ML
3500 VIAL (ML) INJECTION EVERY 6 HOURS
Refills: 0 | Status: DISCONTINUED | OUTPATIENT
Start: 2024-12-17 | End: 2024-12-18

## 2024-12-17 RX ORDER — GLUCAGON INJECTION, SOLUTION 0.5 MG/.1ML
1 INJECTION, SOLUTION SUBCUTANEOUS ONCE
Refills: 0 | Status: DISCONTINUED | OUTPATIENT
Start: 2024-12-17 | End: 2024-12-20

## 2024-12-17 RX ORDER — METOPROLOL TARTRATE 100 MG/1
100 TABLET, FILM COATED ORAL DAILY
Refills: 0 | Status: DISCONTINUED | OUTPATIENT
Start: 2024-12-17 | End: 2024-12-20

## 2024-12-17 RX ORDER — MAGNESIUM, ALUMINUM HYDROXIDE 200-225/5
30 SUSPENSION, ORAL (FINAL DOSE FORM) ORAL EVERY 4 HOURS
Refills: 0 | Status: DISCONTINUED | OUTPATIENT
Start: 2024-12-17 | End: 2024-12-20

## 2024-12-17 RX ORDER — ISOSORBIDE MONONITRATE 10 MG
60 TABLET ORAL DAILY
Refills: 0 | Status: DISCONTINUED | OUTPATIENT
Start: 2024-12-17 | End: 2024-12-17

## 2024-12-17 RX ORDER — HEPARIN SODIUM,PORCINE 1000/ML
7500 VIAL (ML) INJECTION EVERY 6 HOURS
Refills: 0 | Status: DISCONTINUED | OUTPATIENT
Start: 2024-12-17 | End: 2024-12-18

## 2024-12-17 RX ADMIN — Medication 2.5 MILLIGRAM(S): at 15:39

## 2024-12-17 RX ADMIN — Medication 40 MILLIGRAM(S): at 22:06

## 2024-12-17 RX ADMIN — Medication 1700 UNIT(S)/HR: at 18:01

## 2024-12-17 RX ADMIN — METOPROLOL TARTRATE 100 MILLIGRAM(S): 100 TABLET, FILM COATED ORAL at 16:27

## 2024-12-17 RX ADMIN — Medication 1700 UNIT(S)/HR: at 22:07

## 2024-12-17 RX ADMIN — Medication 1: at 18:01

## 2024-12-17 RX ADMIN — FUROSEMIDE 40 MILLIGRAM(S): 40 TABLET ORAL at 16:27

## 2024-12-17 RX ADMIN — Medication 1700 UNIT(S)/HR: at 19:21

## 2024-12-17 RX ADMIN — Medication 60 MILLIGRAM(S): at 16:27

## 2024-12-17 RX ADMIN — HYDRALAZINE HYDROCHLORIDE 50 MILLIGRAM(S): 10 TABLET ORAL at 16:27

## 2024-12-17 RX ADMIN — HYDRALAZINE HYDROCHLORIDE 50 MILLIGRAM(S): 10 TABLET ORAL at 22:05

## 2024-12-17 RX ADMIN — ACETAMINOPHEN 500MG 650 MILLIGRAM(S): 500 TABLET, COATED ORAL at 23:37

## 2024-12-17 NOTE — ED PROVIDER NOTE - CLINICAL SUMMARY MEDICAL DECISION MAKING FREE TEXT BOX
74 yo F with PMH as above, p/w SOB. Preliminary work up remarkable for elevated BNP. Cardiology consulted, patient is a candidate for RCH and LCH and will need to be admitted to medicine for further work up. Patient's case discussed with Dr. Ramirez who will be accepting the patient.

## 2024-12-17 NOTE — H&P ADULT - LOCATION OF DISCUSSION
Dr. Elijah Ralph- Please see pts portal message below. Pt would like Vyvanse 30mg capsules sent to [facility]. Rx has been loaded and sent to the provider for review. Face to face

## 2024-12-17 NOTE — H&P ADULT - HISTORY OF PRESENT ILLNESS
72 yo F with PMH diastolic HF, Pulm HTN, afib on AC, HTN, HLD, DM2,CKD,thyroidectomy  presents to ED with c/o exertional SOB x 4-5 days. Patient reported to have been discharged from the [facility] 10 days ago, where she was treated for shortness of breath/chf execration and plan for out pt cardiac cath. Her symptoms was stable  for a day or two following the discharge but she started feeling fatigue, ,shortness of  breath and difficulty in performing physical tasks. The patient denied experiencing chest pain,cough,fever,chill,n/v/d/dysuria.         Past Medical History : diastolic HF, Pulm HTN, HTN, HLD, DM2,CKD,thyroidectomy .atrial fibrillation.  Past Surgical History : She had thyroid surgery, ovaries cyst removal, or Caesarean section due to being a twin, and biopsy for a benign breast lump.  Family History : Patient mentioned that her grandmother had heart problems and open heart surgery.  Social History : Patient lives with her Daughter,denies smoking/alcohol/illicit drugs   Creatinine: 2.81 mg/dL (12.17.24 @ 11:53)   Hemoglobin: 9.2 g/dL (12.17.24 @ 11:53)

## 2024-12-17 NOTE — H&P ADULT - CONVERSATION DETAILS
74 yo F with PMH diastolic HF, Pulm HTN, afib on AC, HTN, HLD, DM2,CKD,thyroidectomy  presents to ED with c/o exertional SOB x 4-5 days. Patient reported to have been discharged from the [facility] 10 days ago, where she was treated for shortness of breath/chf execration and plan for out pt cardiac cath. pt wants to be full code including intubation/resuscitation. Daughter Lela agreed with pts wishes.

## 2024-12-17 NOTE — CONSULT NOTE ADULT - SUBJECTIVE AND OBJECTIVE BOX
HPI: The pt is a 74 yo Female PMH + CKD due to DM and HTN who presents to the ED with CP and progressive SOB.  We are asked to see regarding evaluation of renal insufficiency.  The patient and daughter are unaware of her baseline renal function.  She is currently more comfortable since admission. Of note, pt was recently admitted to [facility] for same reason with plans for outpatient cardiac cath.       PMH: (above)   Pulm HTN  Afib  Thyroidectomy      FAMILY HISTORY:  No CKD noted.      Social History:  No current tobacco; no EtOH nor drug abuse      MEDICATIONS  (STANDING):  albuterol    0.083% 2.5 milliGRAM(s) Nebulizer every 6 hours  atorvastatin 40 milliGRAM(s) Oral at bedtime  dextrose 5%. 1000 milliLiter(s) (100 mL/Hr) IV Continuous <Continuous>  dextrose 5%. 1000 milliLiter(s) (50 mL/Hr) IV Continuous <Continuous>  dextrose 50% Injectable 25 Gram(s) IV Push once  dextrose 50% Injectable 12.5 Gram(s) IV Push once  dextrose 50% Injectable 25 Gram(s) IV Push once  ergocalciferol 73964 Unit(s) Oral <User Schedule>  furosemide    Tablet 40 milliGRAM(s) Oral daily  glucagon  Injectable 1 milliGRAM(s) IntraMuscular once  heparin  Infusion.  Unit(s)/Hr (17 mL/Hr) IV Continuous <Continuous>  hydrALAZINE 50 milliGRAM(s) Oral three times a day  insulin lispro (ADMELOG) corrective regimen sliding scale   SubCutaneous three times a day before meals  isosorbide   mononitrate ER Tablet (IMDUR) 60 milliGRAM(s) Oral daily  metoprolol succinate  milliGRAM(s) Oral daily    MEDICATIONS  (PRN):  acetaminophen     Tablet .. 650 milliGRAM(s) Oral every 6 hours PRN Temp greater or equal to 38C (100.4F), Mild Pain (1 - 3)  aluminum hydroxide/magnesium hydroxide/simethicone Suspension 30 milliLiter(s) Oral every 4 hours PRN Dyspepsia  dextrose Oral Gel 15 Gram(s) Oral once PRN Blood Glucose LESS THAN 70 milliGRAM(s)/deciliter  heparin   Injectable 7500 Unit(s) IV Push every 6 hours PRN For aPTT less than 40  heparin   Injectable 3500 Unit(s) IV Push every 6 hours PRN For aPTT between 40 - 57  melatonin 3 milliGRAM(s) Oral at bedtime PRN Insomnia  ondansetron Injectable 4 milliGRAM(s) IV Push every 8 hours PRN Nausea and/or Vomiting      Allergies    No Known Allergies    Intolerances        REVIEW OF SYSTEMS:  CONSTITUTIONAL: No fever, weight loss, + fatigue  EYES: No eye pain, visual disturbances, or discharge  ENMT:  No difficulty hearing, tinnitus, vertigo; No sinus or throat pain  NECK: No pain or stiffness  BREASTS: No pain, masses, or nipple discharge  RESPIRATORY: + occ cough, no hemoptysis; + shortness of breath  CARDIOVASCULAR: No chest pain, palpitations, dizziness, + leg swelling  GASTROINTESTINAL: No abdominal or epigastric pain. No nausea, vomiting, or hematemesis; No diarrhea or constipation. No melena or hematochezia.  GENITOURINARY: No dysuria, frequency, hematuria, or incontinence  NEUROLOGICAL: No headaches, memory loss, loss of strength, numbness, or tremors  SKIN: No rashes  MUSCULOSKELETAL: No joint pain or swelling; No muscle, back, or extremity pain        Vital Signs Last 24 Hrs  T(C): 36.6 (17 Dec 2024 10:09), Max: 36.6 (17 Dec 2024 10:09)  T(F): 97.8 (17 Dec 2024 10:09), Max: 97.8 (17 Dec 2024 10:09)  HR: 74 (17 Dec 2024 15:42) (68 - 74)  BP: 180/74 (17 Dec 2024 10:09) (180/74 - 180/74)  BP(mean): --  RR: 20 (17 Dec 2024 10:09) (20 - 20)  SpO2: 100% (17 Dec 2024 15:42) (98% - 100%)    Parameters below as of 17 Dec 2024 15:42  Patient On (Oxygen Delivery Method): room air        PHYSICAL EXAM:  GENERAL: fatigued  HEAD:  Atraumatic, Normocephalic  EYES: Conjunctiva and sclera clear  ENMT: Moist MMs  NECK: Supple, + JVD  NERVOUS SYSTEM:  Alert & Oriented X3, intact and symmetric  CHEST/LUNG: Diminished BS with crackles  HEART: Regular rate and rhythm; No rub  ABDOMEN: Soft, Nontender, +BS  EXTREMITIES:  2+ Peripheral Pulses, + LE dependent edema  SKIN: No rashes or lesions      LABS:                        9.2    6.12  )-----------( 340      ( 17 Dec 2024 11:53 )             28.2     12    138  |  103  |  53.2[H]  ----------------------------<  91  4.3   |  19.0[L]  |  2.81[H]    Ca    9.5      17 Dec 2024 11:53    TPro  7.2  /  Alb  4.3  /  TBili  0.4  /  DBili  x   /  AST  25  /  ALT  17  /  AlkPhos  65  12-    PT/INR - ( 17 Dec 2024 11:53 )   PT: 22.1 sec;   INR: 1.92 ratio         PTT - ( 17 Dec 2024 11:53 )  PTT:33.7 sec  Urinalysis Basic - ( 17 Dec 2024 15:14 )    Color: Yellow / Appearance: Clear / S.013 / pH: x  Gluc: x / Ketone: Negative mg/dL  / Bili: Negative / Urobili: 0.2 mg/dL   Blood: x / Protein: 300 mg/dL / Nitrite: Negative   Leuk Esterase: Negative / RBC: 0 /HPF / WBC 3 /HPF   Sq Epi: x / Non Sq Epi: 1 /HPF / Bacteria: Negative /HPF          RADIOLOGY & ADDITIONAL TESTS:  < from: Xray Chest 1 View- PORTABLE-Urgent (24 @ 11:28) >  ACC: [id number] EXAM:  XR CHEST PORTABLE URGENT 1V   ORDERED BY: KRZYSZTOF WELLINGTON     PROCEDURE DATE:  2024          INTERPRETATION:  An AP portable chest radiograph was performed for chest   pain.    Comparison is made to 2017.    The lungsare clear bilaterally. There are no infiltrates on either side.   There is no pneumothorax. There are no pleural effusions. There is no   hilar or mediastinal widening however the cardiac silhouette is slightly   prominent with engorgement of centralpulmonary veins but without   pulmonary edema. There is an atherosclerotic aorta. There are   degenerative changes of the thoracic spine.    IMPRESSION:  1. Slightly prominent cardiac silhouette with atherosclerosis,   engorgement of central pulmonary veins but with no pulmonary edema.  2. Degenerative changes of the thoracic spine.    < end of copied text >

## 2024-12-17 NOTE — ED PROVIDER NOTE - OBJECTIVE STATEMENT
The patient is a 74 yo F with PMH diastolic HF, HTN, HLD, DM2 who presents to ED for SOB x 4-5 days. Patient was recently admitted to [facility] for work up of similar symptoms and admitted for 4 days. The patient was found to be in CHF exacerbation and diuresed with furosemide. After being stabilized, patient felt well and resumed normal activity at her baseline for one day. However, 48 hours after discharge patient began feeling unwell once again and gradually she began having increasing CHINO and chest tightness. This morning, patient woke up with a 'sinking' feeling in her chest, chest tightness and SOB.    Patient denies chest pain that is radiating, she denies headaches, LOC, dizziness or vomiting.

## 2024-12-17 NOTE — CONSULT NOTE ADULT - SUBJECTIVE AND OBJECTIVE BOX
CHIEF COMPLAINT:    HPI: 73 y.o. female with hx of PAF on AC,  diabetes, hypertension, hyperlipidemia, recent admission at [facility] with dyspnea and leg edema, she was found to have elevated PASP on echo, underwent testing and her pulmonary hypertension was deemed being related to diastolic dysfunction and CHINO. Patient was eventually dc home however, gradually she began having increasing CHINO and chest tightness. This morning, patient woke up with a 'sinking' feeling in her chest, chest tightness and SOB. Cardiology consultation requested for evaluation of dyspnea.     PAST MEDICAL & SURGICAL HISTORY:  HTN (hypertension)      DM (diabetes mellitus)      Chronic diastolic congestive heart failure          Allergies    No Known Allergies    Intolerances        MEDICATIONS  (STANDING):    MEDICATIONS  (PRN):      FAMILY HISTORY:      ***No family history of premature coronary artery disease or sudden cardiac death    SOCIAL HISTORY:  Smoking-  Alcohol-  Illicit Drug use-    REVIEW OF SYSTEMS:  Constitutional: [ ] fever, [ ]weight loss,  [ ]fatigue  Eyes: [ ] visual changes  Respiratory: [x ]shortness of breath;  [ ] cough, [ ]wheezing, [ ]chills, [ ]hemoptysis  Cardiovascular: [ ] chest pain, [ ]palpitations, [ ]dizziness,  [ ]leg swelling [ ]syncope  Gastrointestinal: [ ] abdominal pain, [ ]nausea, [ ]vomiting,  [ ]diarrhea   Genitourinary: [ ] dysuria, [ ] hematuria  Neurologic: [ ] headaches [ ] tremors  [ ] weakness [ ] lightheadedness  Skin: [ ] itching, [ ]burning, [ ] rashes  Endocrine: [ ] heat or cold intolerance  Musculoskeletal: [ ] joint pain or swelling; [ ] muscle, back, or extremity pain  Psychiatric: [ ] depression, [ ]anxiety, [ ]mood swings, or [ ]difficulty sleeping  Hematologic: [ ] easy bruising, [ ] bleeding gums     [ x] All others negative	  [ ] Unable to obtain    Vital Signs Last 24 Hrs  T(C): 36.6 (17 Dec 2024 10:09), Max: 36.6 (17 Dec 2024 10:09)  T(F): 97.8 (17 Dec 2024 10:09), Max: 97.8 (17 Dec 2024 10:09)  HR: 68 (17 Dec 2024 10:09) (68 - 68)  BP: 180/74 (17 Dec 2024 10:09) (180/74 - 180/74)  BP(mean): --  RR: 20 (17 Dec 2024 10:09) (20 - 20)  SpO2: 98% (17 Dec 2024 10:09) (98% - 98%)    Parameters below as of 17 Dec 2024 10:09  Patient On (Oxygen Delivery Method): room air      I&O's Summary      PHYSICAL EXAM:  General: No acute distress  HEENT: EOMI  Neck:  No JVD  Lungs: Clear to auscultation bilaterally; No rales or wheezing  Heart: Regular rate and rhythm; No murmurs, rubs, or gallops  Abdomen: soft, non tender, non distended   Extremities: warm, no edema   Nervous system:  Alert & Oriented X3  Psychiatric: Normal affect  Skin: No rashes or lesions    LABS:  12-17    138  |  103  |  53.2[H]  ----------------------------<  91  4.3   |  19.0[L]  |  2.81[H]    Ca    9.5      17 Dec 2024 11:53    TPro  7.2  /  Alb  4.3  /  TBili  0.4  /  DBili  x   /  AST  25  /  ALT  17  /  AlkPhos  65  12-17    Creatinine Trend: 2.81<--                        9.2    6.12  )-----------( 340      ( 17 Dec 2024 11:53 )             28.2     PT/INR - ( 17 Dec 2024 11:53 )   PT: 22.1 sec;   INR: 1.92 ratio         PTT - ( 17 Dec 2024 11:53 )  PTT:33.7 sec    Lipid Panel:   Cardiac Enzymes:           RADIOLOGY: CXR prominent lung markings suggestive of pulmonary HTN    TELEMETRY:    ECHO gsh: 12/2024  Echo with dilated RA, RV and moderate TR with PA pressure 60 mm Hg. Mild MR and normal LV systolic fx.       STRESS TEST:    CATHETERIZATION: CHIEF COMPLAINT:    HPI: 73 y.o. female with hx of PAF on AC,  diabetes, hypertension, hyperlipidemia, recent admission at [facility] with dyspnea and leg edema, she was found to have elevated PASP on echo, underwent testing and her pulmonary hypertension was deemed being related to diastolic dysfunction and CHINO. Patient was eventually dc home however, gradually she began having increasing CHINO and chest tightness. This morning, patient woke up with a 'sinking' feeling in her chest, chest tightness and SOB. Cardiology consultation requested for evaluation of dyspnea.     PAST MEDICAL & SURGICAL HISTORY:  HTN (hypertension)      DM (diabetes mellitus)      Chronic diastolic congestive heart failure          Allergies    No Known Allergies    Intolerances        MEDICATIONS  (STANDING):    MEDICATIONS  (PRN):      FAMILY HISTORY:      ***No family history of premature coronary artery disease or sudden cardiac death    SOCIAL HISTORY:  Smoking-  Alcohol-  Illicit Drug use-    REVIEW OF SYSTEMS:  Constitutional: [ ] fever, [ ]weight loss,  [ ]fatigue  Eyes: [ ] visual changes  Respiratory: [x ]shortness of breath;  [ ] cough, [ ]wheezing, [ ]chills, [ ]hemoptysis  Cardiovascular: [ ] chest pain, [ ]palpitations, [ ]dizziness,  [ ]leg swelling [ ]syncope  Gastrointestinal: [ ] abdominal pain, [ ]nausea, [ ]vomiting,  [ ]diarrhea   Genitourinary: [ ] dysuria, [ ] hematuria  Neurologic: [ ] headaches [ ] tremors  [ ] weakness [ ] lightheadedness  Skin: [ ] itching, [ ]burning, [ ] rashes  Endocrine: [ ] heat or cold intolerance  Musculoskeletal: [ ] joint pain or swelling; [ ] muscle, back, or extremity pain  Psychiatric: [ ] depression, [ ]anxiety, [ ]mood swings, or [ ]difficulty sleeping  Hematologic: [ ] easy bruising, [ ] bleeding gums     [ x] All others negative	  [ ] Unable to obtain    Vital Signs Last 24 Hrs  T(C): 36.6 (17 Dec 2024 10:09), Max: 36.6 (17 Dec 2024 10:09)  T(F): 97.8 (17 Dec 2024 10:09), Max: 97.8 (17 Dec 2024 10:09)  HR: 68 (17 Dec 2024 10:09) (68 - 68)  BP: 180/74 (17 Dec 2024 10:09) (180/74 - 180/74)  BP(mean): --  RR: 20 (17 Dec 2024 10:09) (20 - 20)  SpO2: 98% (17 Dec 2024 10:09) (98% - 98%)    Parameters below as of 17 Dec 2024 10:09  Patient On (Oxygen Delivery Method): room air      I&O's Summary      PHYSICAL EXAM:  General: No acute distress  HEENT: EOMI  Neck:  No JVD  Lungs: Clear to auscultation bilaterally; No rales or wheezing  Heart: Regular rate and rhythm; No murmurs, rubs, or gallops  Abdomen: soft, non tender, non distended   Extremities: warm, no edema   Nervous system:  Alert & Oriented X3  Psychiatric: Normal affect  Skin: No rashes or lesions    LABS:  12-17    138  |  103  |  53.2[H]  ----------------------------<  91  4.3   |  19.0[L]  |  2.81[H]    Ca    9.5      17 Dec 2024 11:53    TPro  7.2  /  Alb  4.3  /  TBili  0.4  /  DBili  x   /  AST  25  /  ALT  17  /  AlkPhos  65  12-17    Creatinine Trend: 2.81<--                        9.2    6.12  )-----------( 340      ( 17 Dec 2024 11:53 )             28.2     PT/INR - ( 17 Dec 2024 11:53 )   PT: 22.1 sec;   INR: 1.92 ratio         PTT - ( 17 Dec 2024 11:53 )  PTT:33.7 sec    Lipid Panel:   Cardiac Enzymes:           RADIOLOGY: CXR prominent lung markings suggestive of pulmonary HTN    TELEMETRY: SR   EKG: NSR, nonspecific ST abnormalities.     ECHO gsh: 12/2024  Echo with dilated RA, RV and moderate TR with PA pressure 60 mm Hg. Mild MR and normal LV systolic fx.       STRESS TEST:    CATHETERIZATION:

## 2024-12-17 NOTE — H&P ADULT - NSHPPHYSICALEXAM_GEN_ALL_CORE
T(C): 36.6 (12-17-24 @ 10:09), Max: 36.6 (12-17-24 @ 10:09)  HR: 68 (12-17-24 @ 10:09) (68 - 68)  BP: 180/74 (12-17-24 @ 10:09) (180/74 - 180/74)  RR: 20 (12-17-24 @ 10:09) (20 - 20)  SpO2: 98% (12-17-24 @ 10:09) (98% - 98%)    GEN - NAD  HEENT - NCAT, EOMI, COLLEEN,   RESP - CTA BL, no wheeze/ rhonchi/crackles. not on supplemental O2.  CARDIO - NS1S2, No murmurs  ABD - Soft/Non tender/Non distended. Normal BS x4 quadrants.   Ext - No REHAN.   MSK - full ROM of BL upper and lower extremities without pain or restriction. BL 5/5 strength on upper and lower extremities.   Neuro - cn 2-12 grossly intact. . no visible seizure activity appreciated. no tremor. gait not observed.   Psych- AAOx3. attentive. normal affect.

## 2024-12-17 NOTE — PATIENT PROFILE ADULT - DO YOU EVER NEED HELP READING HOSPITAL MATERIALS?
no Modified Advancement Flap Text: The defect edges were debeveled with a #15c scalpel blade.  Given the location of the defect, shape of the defect and the proximity to free margins a modified advancement flap was deemed most appropriate.  Using a sterile surgical marker, an appropriate advancement flap was drawn incorporating the defect and placing the expected incisions within the relaxed skin tension lines where possible.    The area thus outlined was incised deep to adipose tissue with a #15 scalpel blade.  The skin margins were undermined to an appropriate distance in all directions utilizing iris scissors.

## 2024-12-17 NOTE — ED ADULT NURSE NOTE - OBJECTIVE STATEMENT
Pt presents to ED a&ox4 c/o SOB. Pt states for past two days she has been having trouble breathing and was seen at [facility] earlier this week and released. Pt states symptoms have not improved and she feels like something is wrong. Pt denies difficulty breathing, chest pain, fever, chills, n/v/d. NAD noted, respirations equal and unlabored.

## 2024-12-17 NOTE — CONSULT NOTE ADULT - ASSESSMENT
73 y.o. female with hx of PAF on AC,  diabetes, hypertension, hyperlipidemia, recent admission at [facility] with dyspnea and leg edema, she was found to have elevated PASP on echo, underwent testing and her pulmonary hypertension was deemed being related to diastolic dysfunction and CHINO. Patient was eventually dc home however, gradually she began having increasing CHINO and chest tightness. This morning, patient woke up with a 'sinking' feeling in her chest, chest tightness and SOB. Cardiology consultation requested for evaluation of dyspnea 73 y.o. female with hx of PAF on AC,  diabetes, hypertension, hyperlipidemia, recent admission at [facility] with dyspnea and leg edema, she was found to have elevated PASP on echo, underwent testing and her pulmonary hypertension was deemed being related to diastolic dysfunction and CHINO. Patient was eventually dc home however, gradually she began having increasing CHINO and chest tightness. This morning, patient woke up with a 'sinking' feeling in her chest, chest tightness and SOB. Cardiology consultation requested for evaluation of dyspnea      On exam patient looks almost euvolemic   dyspnea of exertion likely related to PHTN, however obstructive CAD also a possibility given her increased ASCVD risk     Plan   TTE  LHC + RHC   will need nephrology consultation prior to cath   start heparin drip, monitor PTT  further recs post testing.

## 2024-12-17 NOTE — H&P ADULT - ASSESSMENT
72 yo F with PMH diastolic HF, Pulm HTN, afib on AC, HTN, HLD, DM2,CKD,thyroidectomy  presents to ED with c/o exertional SOB x 4-5 days. Patient reported to have been discharged from the [facility] 10 days ago, where she was treated for shortness of breath/chf execration and plan for out pt cardiac cath. Her symptoms was stable  for a day or two following the discharge but she started feeling fatigue, ,shortness of  breath and difficulty in performing physical tasks.       Dyspnea Likley sec to acute on chronic diastolic heart failure, Pulm HTN  -telemetry   -SO2 Stable RA  -trop stable,  -bnp 1917 pg/mL (12.17.24 @ 11:53)   -cxr pending result  -pt is on lasix MWF.  -Per cardiology,hold off iv lasix as pt does not look overloaded. start po lasix for now  -Plan for cardiac cath likely tomorrow. needs to optimize renal function  -rec heparin drip w/o bolus  -resume BB,imdur,hydralazine  -NEBS  -TTE  --i/o  -monitor renal function    Afib  -hold Eliquis for cath. pt took morning dose .  -continue BB  -Heparin drip w/o bolus per cardiology  -hb 9.2, unknown baseline. will monitor cbc    CKD  -no prior lab  -per , cr was 3 at [facility] during discharge  -Cr 2.81  -f/u renal function  -adjust lasix dose per renal function  -c/s nephro() for optimize renal function for cath     DM  -hold Glipizide  -a1c, issc    HTN, HLD,   -High bp  -resume home meds BB,Hydralazine,imdur  -monitor bp    dvt ppx : heparin drip  GI PPX: PPI  Plan of care dw pt and daughter Lela at bedside    JOAQUIN Connor.       74 yo F with PMH diastolic HF, Pulm HTN, afib on AC, HTN, HLD, DM2,CKD,thyroidectomy  presents to ED with c/o exertional SOB x 4-5 days. Patient reported to have been discharged from the [facility] 10 days ago, where she was treated for shortness of breath/chf execration and plan for out pt cardiac cath. Her symptoms was stable  for a day or two following the discharge but she started feeling fatigue, ,shortness of  breath and difficulty in performing physical tasks.       Dyspnea Likley sec to acute on chronic diastolic heart failure, Pulm HTN  -telemetry   -SO2 Stable RA  -trop stable,  -bnp 1917 pg/mL (12.17.24 @ 11:53)   -cxr pending result  -pt is on lasix MWF.  -Per cardiology,hold off iv lasix as pt does not look overloaded. start po lasix for now  -Plan for cardiac cath likely tomorrow. needs to optimize renal function  -rec heparin drip w/o bolus  -resume BB,imdur,hydralazine  -NEBS  -TTE  --i/o  -monitor renal function    Afib  -rate control  -hold Eliquis for cath. pt took morning dose .  -continue BB  -Heparin drip w/o bolus per cardiology  -hb 9.2, unknown baseline. will monitor cbc    CKD stage 4  -no prior lab  -per , cr was 3 at [facility] during discharge  -Cr 2.81  -f/u renal function  -adjust lasix dose per renal function  -c/s nephro() for optimize renal function for cath     DM  -hold Glipizide  -a1c, issc    HTN, HLD,   -High bp  -resume home meds BB,Hydralazine,imdur  -monitor bp    ?Anemia chronic disease  -hb 9.2  -unknown baseline  -no active bleed present  -monitor cbc    dvt ppx : heparin drip  GI PPX: PPI  Plan of care dw pt and daughter Lela at bedside    JOAQUIN Connor.       74 yo F with PMH diastolic HF, Pulm HTN, afib on AC, HTN, HLD, DM2,CKD,thyroidectomy  presents to ED with c/o exertional SOB x 4-5 days. Patient reported to have been discharged from the [facility] 10 days ago, where she was treated for shortness of breath/chf execration and plan for out pt cardiac cath. Her symptoms was stable  for a day or two following the discharge but she started feeling fatigue, ,shortness of  breath and difficulty in performing physical tasks.       Dyspnea Likley sec to acute on chronic diastolic heart failure, Pulm HTN  -telemetry   -SO2 Stable RA  -trop stable,  -bnp 1917 pg/mL (12.17.24 @ 11:53)   -cxr pending result  -pt is on lasix MWF.  -Per cardiology,hold off iv lasix as pt does not look overloaded. start po lasix for now  -Plan for cardiac cath likely tomorrow. needs to optimize renal function  -rec heparin drip w/o bolus  -resume BB,imdur,hydralazine  -NEBS  -TTE  --i/o  -monitor renal function    Afib  -rate control  -hold Eliquis for cath. pt took morning dose .  -continue BB  -Heparin drip w/o bolus per cardiology  -hb 9.2, unknown baseline. will monitor cbc    CKD stage 4  -no prior lab  -per , cr was 3 at [facility] during discharge  -Cr 2.81,co219,bun 53.2  -f/u renal function  -adjust lasix dose per renal function  -c/s nephro() for optimize renal function for cath     DM  -hold Glipizide  -a1c, issc    HTN, HLD,   -High bp  -resume home meds BB,Hydralazine,imdur  -monitor bp    ?Anemia chronic disease  -hb 9.2  -unknown baseline  -no active bleed present  -monitor cbc    dvt ppx : heparin drip  GI PPX: PPI  Plan of care dw pt and daughter Lela at bedside    JOAQUIN Connor.

## 2024-12-17 NOTE — ED PROVIDER NOTE - ATTENDING CONTRIBUTION TO CARE
73yoF;  with PMH significant PE A-fib (on AC), DM, HTN, HLD, CHF (recently started on furosemide 1 week ago during admission to [facility]); now presenting with progressively worsening shortness of breath over 4-5 days.  Reports constant chest pain since being discharged.  Chest pain–substernal, nonradiating, nonpleuritic, nonexertional.  Denies fever, chills, sweats.  Denies any cough.  Denies back pain.  General:     NAD  Head:     NC/AT, EOMI, oral mucosa moist  Neck:     trachea midline  Lungs:     CTA b/l, no w/r/r  CVS:     S1S2, RRR, no m/g/r  Abd:     +BS, s/nt/nd, no organomegaly  Ext:    2+ radial and pedal pulses, no c/c/e  Neuro: AAOx3, no sensory/motor deficits  A/P: 73yoF;  with PMH significant PE A-fib (on AC), DM, HTN, HLD, CHF (recently started on furosemide 1 week ago during admission to [facility]); now presenting with progressively worsening shortness of breath over 4-5 days.  labs, ekg, cardiac monitor, cardiology consult.

## 2024-12-17 NOTE — ED PROVIDER NOTE - NSICDXPASTMEDICALHX_GEN_ALL_CORE_FT
PAST MEDICAL HISTORY:  Chronic diastolic congestive heart failure     DM (diabetes mellitus)     HTN (hypertension)

## 2024-12-17 NOTE — CONSULT NOTE ADULT - ASSESSMENT
CKD: +DM, HTN (baseline Screat unknown to me at this time)  Admitted with CP/SOB, known CAD  (?) CHF component  Pt scheduled for cardiac cath tomorrow  - diuretics as needed and accept azotemia--> if possible, hold diuretics prior to cardiac cath  - pt and daughter aware she is at elevated risk for JORGE A/ATN and worsening renal function post procedure==> pt would benefit from IV isotonic fluid hydration pre and post cath, however this may not be prudent given concerns for precipitating PVC  - trend labs

## 2024-12-17 NOTE — PHARMACOTHERAPY INTERVENTION NOTE - COMMENTS
Referenced medical facility records and spoke with patient and patient's daughter at bedside to obtain medication list. Of note, patient is prescribed furosemide 40mg 3 times a week on Monday, Wednesday and Friday however, patient is unsure if they took their dose on 12/16.

## 2024-12-18 ENCOUNTER — TRANSCRIPTION ENCOUNTER (OUTPATIENT)
Age: 74
End: 2024-12-18

## 2024-12-18 DIAGNOSIS — Z98.890 OTHER SPECIFIED POSTPROCEDURAL STATES: Chronic | ICD-10-CM

## 2024-12-18 DIAGNOSIS — Z98.891 HISTORY OF UTERINE SCAR FROM PREVIOUS SURGERY: Chronic | ICD-10-CM

## 2024-12-18 LAB
A1C WITH ESTIMATED AVERAGE GLUCOSE RESULT: 5.9 % — HIGH (ref 4–5.6)
ANION GAP SERPL CALC-SCNC: 14 MMOL/L — SIGNIFICANT CHANGE UP (ref 5–17)
APTT BLD: >200 SEC — CRITICAL HIGH (ref 24.5–35.6)
BUN SERPL-MCNC: 47.8 MG/DL — HIGH (ref 8–20)
CALCIUM SERPL-MCNC: 9 MG/DL — SIGNIFICANT CHANGE UP (ref 8.4–10.5)
CHLORIDE SERPL-SCNC: 104 MMOL/L — SIGNIFICANT CHANGE UP (ref 96–108)
CHOLEST SERPL-MCNC: 119 MG/DL — SIGNIFICANT CHANGE UP
CO2 SERPL-SCNC: 23 MMOL/L — SIGNIFICANT CHANGE UP (ref 22–29)
CREAT SERPL-MCNC: 2.77 MG/DL — HIGH (ref 0.5–1.3)
EGFR: 18 ML/MIN/1.73M2 — LOW
ESTIMATED AVERAGE GLUCOSE: 123 MG/DL — HIGH (ref 68–114)
GLUCOSE BLDC GLUCOMTR-MCNC: 100 MG/DL — HIGH (ref 70–99)
GLUCOSE BLDC GLUCOMTR-MCNC: 100 MG/DL — HIGH (ref 70–99)
GLUCOSE BLDC GLUCOMTR-MCNC: 109 MG/DL — HIGH (ref 70–99)
GLUCOSE BLDC GLUCOMTR-MCNC: 112 MG/DL — HIGH (ref 70–99)
GLUCOSE BLDC GLUCOMTR-MCNC: 159 MG/DL — HIGH (ref 70–99)
GLUCOSE SERPL-MCNC: 95 MG/DL — SIGNIFICANT CHANGE UP (ref 70–99)
HCT VFR BLD CALC: 27.7 % — LOW (ref 34.5–45)
HDLC SERPL-MCNC: 55 MG/DL — SIGNIFICANT CHANGE UP
HGB BLD-MCNC: 9.1 G/DL — LOW (ref 11.5–15.5)
LIPID PNL WITH DIRECT LDL SERPL: 51 MG/DL — SIGNIFICANT CHANGE UP
MCHC RBC-ENTMCNC: 29.2 PG — SIGNIFICANT CHANGE UP (ref 27–34)
MCHC RBC-ENTMCNC: 32.9 G/DL — SIGNIFICANT CHANGE UP (ref 32–36)
MCV RBC AUTO: 88.8 FL — SIGNIFICANT CHANGE UP (ref 80–100)
NON HDL CHOLESTEROL: 64 MG/DL — SIGNIFICANT CHANGE UP
PLATELET # BLD AUTO: 337 K/UL — SIGNIFICANT CHANGE UP (ref 150–400)
POTASSIUM SERPL-MCNC: 4.5 MMOL/L — SIGNIFICANT CHANGE UP (ref 3.5–5.3)
POTASSIUM SERPL-SCNC: 4.5 MMOL/L — SIGNIFICANT CHANGE UP (ref 3.5–5.3)
RBC # BLD: 3.12 M/UL — LOW (ref 3.8–5.2)
RBC # FLD: 13.1 % — SIGNIFICANT CHANGE UP (ref 10.3–14.5)
SODIUM SERPL-SCNC: 141 MMOL/L — SIGNIFICANT CHANGE UP (ref 135–145)
T4 FREE SERPL-MCNC: 1.6 NG/DL — SIGNIFICANT CHANGE UP (ref 0.9–1.7)
TRIGL SERPL-MCNC: 65 MG/DL — SIGNIFICANT CHANGE UP
TSH SERPL-MCNC: 4.82 UIU/ML — HIGH (ref 0.27–4.2)
WBC # BLD: 5.4 K/UL — SIGNIFICANT CHANGE UP (ref 3.8–10.5)
WBC # FLD AUTO: 5.4 K/UL — SIGNIFICANT CHANGE UP (ref 3.8–10.5)

## 2024-12-18 PROCEDURE — 93460 R&L HRT ART/VENTRICLE ANGIO: CPT | Mod: 26

## 2024-12-18 PROCEDURE — 99233 SBSQ HOSP IP/OBS HIGH 50: CPT

## 2024-12-18 PROCEDURE — 99152 MOD SED SAME PHYS/QHP 5/>YRS: CPT

## 2024-12-18 RX ORDER — APIXABAN 2.5 MG/1
5 TABLET, FILM COATED ORAL
Refills: 0 | Status: DISCONTINUED | OUTPATIENT
Start: 2024-12-19 | End: 2024-12-20

## 2024-12-18 RX ORDER — ERGOCALCIFEROL (VITAMIN D2) 200 MCG/ML
1 DROPS ORAL
Refills: 0 | DISCHARGE

## 2024-12-18 RX ORDER — SODIUM CHLORIDE 9 MG/ML
1000 INJECTION, SOLUTION INTRAMUSCULAR; INTRAVENOUS; SUBCUTANEOUS
Refills: 0 | Status: DISCONTINUED | OUTPATIENT
Start: 2024-12-18 | End: 2024-12-20

## 2024-12-18 RX ORDER — GLIPIZIDE 5 MG/1
1 TABLET, FILM COATED, EXTENDED RELEASE ORAL
Qty: 30 | Refills: 0
Start: 2024-12-18 | End: 2025-01-16

## 2024-12-18 RX ORDER — FAMOTIDINE 20 MG/1
1 TABLET, FILM COATED ORAL
Refills: 0 | DISCHARGE

## 2024-12-18 RX ORDER — FUROSEMIDE 40 MG/1
1 TABLET ORAL
Refills: 0 | DISCHARGE

## 2024-12-18 RX ORDER — FUROSEMIDE 40 MG/1
1 TABLET ORAL
Qty: 13 | Refills: 0
Start: 2024-12-18 | End: 2025-01-16

## 2024-12-18 RX ORDER — METOPROLOL TARTRATE 100 MG/1
1 TABLET, FILM COATED ORAL
Refills: 0 | DISCHARGE

## 2024-12-18 RX ORDER — FAMOTIDINE 20 MG/1
1 TABLET, FILM COATED ORAL
Qty: 14 | Refills: 0
Start: 2024-12-18 | End: 2024-12-31

## 2024-12-18 RX ORDER — APIXABAN 2.5 MG/1
1 TABLET, FILM COATED ORAL
Refills: 0 | DISCHARGE

## 2024-12-18 RX ORDER — HYDRALAZINE HYDROCHLORIDE 10 MG/1
1 TABLET ORAL
Qty: 90 | Refills: 0
Start: 2024-12-18 | End: 2025-01-16

## 2024-12-18 RX ORDER — ISOSORBIDE MONONITRATE 10 MG
1 TABLET ORAL
Qty: 30 | Refills: 0
Start: 2024-12-18 | End: 2025-01-16

## 2024-12-18 RX ORDER — GLIPIZIDE 5 MG/1
1 TABLET, FILM COATED, EXTENDED RELEASE ORAL
Refills: 0 | DISCHARGE

## 2024-12-18 RX ORDER — APIXABAN 2.5 MG/1
1 TABLET, FILM COATED ORAL
Qty: 60 | Refills: 0
Start: 2024-12-18 | End: 2025-01-16

## 2024-12-18 RX ORDER — METOPROLOL TARTRATE 100 MG/1
1 TABLET, FILM COATED ORAL
Qty: 30 | Refills: 0
Start: 2024-12-18 | End: 2025-01-16

## 2024-12-18 RX ORDER — HYDRALAZINE HYDROCHLORIDE 10 MG/1
1 TABLET ORAL
Refills: 0 | DISCHARGE

## 2024-12-18 RX ORDER — ERGOCALCIFEROL (VITAMIN D2) 200 MCG/ML
1 DROPS ORAL
Qty: 4 | Refills: 0
Start: 2024-12-18 | End: 2025-01-16

## 2024-12-18 RX ORDER — ISOSORBIDE MONONITRATE 10 MG
1 TABLET ORAL
Refills: 0 | DISCHARGE

## 2024-12-18 RX ADMIN — Medication 1700 UNIT(S)/HR: at 07:45

## 2024-12-18 RX ADMIN — FUROSEMIDE 40 MILLIGRAM(S): 40 TABLET ORAL at 05:55

## 2024-12-18 RX ADMIN — Medication 2.5 MILLIGRAM(S): at 09:48

## 2024-12-18 RX ADMIN — Medication 0 UNIT(S)/HR: at 09:50

## 2024-12-18 RX ADMIN — Medication 60 MILLIGRAM(S): at 11:11

## 2024-12-18 RX ADMIN — Medication 2.5 MILLIGRAM(S): at 04:39

## 2024-12-18 RX ADMIN — HYDRALAZINE HYDROCHLORIDE 50 MILLIGRAM(S): 10 TABLET ORAL at 13:12

## 2024-12-18 RX ADMIN — HYDRALAZINE HYDROCHLORIDE 50 MILLIGRAM(S): 10 TABLET ORAL at 21:39

## 2024-12-18 RX ADMIN — HYDRALAZINE HYDROCHLORIDE 50 MILLIGRAM(S): 10 TABLET ORAL at 05:55

## 2024-12-18 RX ADMIN — Medication 1700 UNIT(S)/HR: at 02:15

## 2024-12-18 RX ADMIN — METOPROLOL TARTRATE 100 MILLIGRAM(S): 100 TABLET, FILM COATED ORAL at 05:55

## 2024-12-18 RX ADMIN — Medication 81 MILLIGRAM(S): at 16:22

## 2024-12-18 RX ADMIN — Medication 40 MILLIGRAM(S): at 21:39

## 2024-12-18 RX ADMIN — ACETAMINOPHEN 500MG 650 MILLIGRAM(S): 500 TABLET, COATED ORAL at 00:00

## 2024-12-18 RX ADMIN — SODIUM CHLORIDE 50 MILLILITER(S): 9 INJECTION, SOLUTION INTRAMUSCULAR; INTRAVENOUS; SUBCUTANEOUS at 23:29

## 2024-12-18 NOTE — DISCHARGE NOTE PROVIDER - NSDCMRMEDTOKEN_GEN_ALL_CORE_FT
apixaban 5 mg oral tablet: 1 tab(s) orally 2 times a day  atorvastatin 40 mg oral tablet: 1 tab(s) orally once a day (at bedtime)  ergocalciferol 1.25 mg (50,000 intl units) oral capsule: 1 cap(s) orally once a week  famotidine 40 mg oral tablet: 1 tab(s) orally once a day (at bedtime)  furosemide 40 mg oral tablet: 1 tab(s) orally 3 times a week on [facility]  glipiZIDE 5 mg oral tablet: 1 tab(s) orally once a day  hydrALAZINE 50 mg oral tablet: 1 tab(s) orally 3 times a day  isosorbide mononitrate 60 mg oral tablet, extended release: 1 tab(s) orally once a day  metoprolol succinate 100 mg oral tablet, extended release: 1 tab(s) orally once a day   Lab Facility: [id number] Lab Facility: [id number] apixaban 5 mg oral tablet: 1 tab(s) orally 2 times a day  atorvastatin 40 mg oral tablet: 1 tab(s) orally once a day (at bedtime)  carbamide peroxide 6.5% otic solution: 1 drop(s) to each affected ear 2 times a day  ergocalciferol 1.25 mg (50,000 intl units) oral capsule: 1 cap(s) orally once a week  famotidine 40 mg oral tablet: 1 tab(s) orally once a day (at bedtime)  glipiZIDE 5 mg oral tablet: 1 tab(s) orally once a day  hydrALAZINE 50 mg oral tablet: 1 tab(s) orally 3 times a day  isosorbide mononitrate 60 mg oral tablet, extended release: 1 tab(s) orally once a day  meclizine 25 mg oral tablet: 1 tab(s) orally 3 times a day As needed Dizziness  metoprolol succinate 100 mg oral tablet, extended release: 1 tab(s) orally once a day   apixaban 5 mg oral tablet: 1 tab(s) orally 2 times a day  atorvastatin 40 mg oral tablet: 1 tab(s) orally once a day (at bedtime)  carbamide peroxide 6.5% otic solution: 1 drop(s) to each affected ear 2 times a day  ergocalciferol 1.25 mg (50,000 intl units) oral capsule: 1 cap(s) orally once a week  famotidine 40 mg oral tablet: 1 tab(s) orally once a day (at bedtime)  glipiZIDE 5 mg oral tablet: 1 tab(s) orally once a day  hydrALAZINE 50 mg oral tablet: 1 tab(s) orally 3 times a day  isosorbide mononitrate 60 mg oral tablet, extended release: 1 tab(s) orally once a day  meclizine 25 mg oral tablet: 1 tab(s) orally 3 times a day as needed for Dizziness  metoprolol succinate 100 mg oral tablet, extended release: 1 tab(s) orally once a day

## 2024-12-18 NOTE — DISCHARGE NOTE PROVIDER - HOSPITAL COURSE
Patient is a 73 year old female who is being managed as an inpatient for dyspnea. She has a PMH of HFpEF, PHTN, afib on AC, HTN, HLD, DM2, CKD, thyroidectomy. Cardiology was consulted and planned for a cardiac cath. Nephrology also has been following due to CKD and planned cath. Cardiac cath was done and showed no significant coronary artery stenosis. Initially patient was on heparin gtt due to planned procedure, but she has subsequently been transitioned back to home Eliquis. She will continue all other cardiac medications as per Cardiology recommendations. Patient is a 73 year old female who is being managed as an inpatient for dyspnea. She has a PMH of HFpEF, PHTN, afib on AC, HTN, HLD, DM2, CKD, thyroidectomy. Cardiology was consulted and planned for a cardiac cath. Nephrology also has been following due to CKD and planned cath. Cardiac cath was done and showed no significant coronary artery stenosis. Initially patient was on heparin gtt due to planned procedure, but she has subsequently been transitioned back to home Eliquis. She will continue all other cardiac medications as per Cardiology recommendations. Lasix will be held as per Cardiology and Nephrology due to renal function and patient being volume depleted. She will follow up with Cardiology and Nephrology in short interval as an outpatient.

## 2024-12-18 NOTE — PROGRESS NOTE ADULT - ASSESSMENT
Procedure: Left heart catheterization    1. S/P LHC: Mild to moderate CAD  ·	Remove radial band and brachial sheath at: 7:00 PM  ·	Wrist precautions explained.  ·	Medications: Continue current medications.  ·	***PLEASE DO NOT ADMINISTER BLOOD TRANSFUSION ON THIS PATIENT WITHIN 72 HOURS OF CARDIAC CATHERIZATION (UNLESS PATIENT IS HEMODYNAMICALLY UNSTABLE OR ACTIVELY BLEEDING) WITHOUT FIRST DISCUSSING WITH INTERVENTIONALIST DR. Matos ON TEAMS OR CALLING CATH LAB HOLDING [phone]***    2. HFpEF  ·	RHC: PA: 44/12 (23), PCWP: 10  ·	GDMT  ·	     Beta Blocker: Will continue Toprol 100 mg daily  ·	     RAAS Inhibitor: CKD  ·	     MRA: N/A  ·	     Diuretic: Will continue Lasix 40 mg daily  ·	     SGLT2i: N/A  ·	     Other: Will continue Imdur 60 mg daily and hydralazine 50 mg TID  ·	Strict I&O's  ·	Daily standing weights (if able)    3. PAF  ·	CHADS2-Vasc: 5 for age, gender HFpEF, HTN, and DM  ·	Anticoagulation: Will resume heparin drip 4 hours after band removal and restart Eliquis 5 mg BID before discharge  ·	Rate/Rhythm Control: Will continue Toprol 100 mg daily    4. Acute on chronic Kidney disease  ·	Stage: 4  ·	GFR: 18  ·	Pre-procedure hydration: N/A  ·	Post-procedure hydration: NS at 50 mL/hr for 8 hours  ·	BMP in AM if staying overnight.  ·	BMP in 3 days with results to go to PMD.    Disposition:   ·	Return to bed.

## 2024-12-18 NOTE — PROGRESS NOTE ADULT - SUBJECTIVE AND OBJECTIVE BOX
[facility] Division of Hospital Medicine  Jazmin Chen, DO  I'm reachable on [employer]    Patient is a 73y old  Female who presents with a chief complaint of dyspnea (18 Dec 2024 09:55)      SUBJECTIVE / OVERNIGHT EVENTS:      MEDICATIONS  (STANDING):  albuterol    0.083% 2.5 milliGRAM(s) Nebulizer every 6 hours  atorvastatin 40 milliGRAM(s) Oral at bedtime  dextrose 5%. 1000 milliLiter(s) (100 mL/Hr) IV Continuous <Continuous>  dextrose 5%. 1000 milliLiter(s) (50 mL/Hr) IV Continuous <Continuous>  dextrose 50% Injectable 25 Gram(s) IV Push once  dextrose 50% Injectable 12.5 Gram(s) IV Push once  dextrose 50% Injectable 25 Gram(s) IV Push once  ergocalciferol 28590 Unit(s) Oral <User Schedule>  furosemide    Tablet 40 milliGRAM(s) Oral daily  glucagon  Injectable 1 milliGRAM(s) IntraMuscular once  heparin  Infusion.  Unit(s)/Hr (17 mL/Hr) IV Continuous <Continuous>  hydrALAZINE 50 milliGRAM(s) Oral three times a day  insulin lispro (ADMELOG) corrective regimen sliding scale   SubCutaneous three times a day before meals  isosorbide   mononitrate ER Tablet (IMDUR) 60 milliGRAM(s) Oral daily  metoprolol succinate  milliGRAM(s) Oral daily    MEDICATIONS  (PRN):  acetaminophen     Tablet .. 650 milliGRAM(s) Oral every 6 hours PRN Temp greater or equal to 38C (100.4F), Mild Pain (1 - 3)  aluminum hydroxide/magnesium hydroxide/simethicone Suspension 30 milliLiter(s) Oral every 4 hours PRN Dyspepsia  dextrose Oral Gel 15 Gram(s) Oral once PRN Blood Glucose LESS THAN 70 milliGRAM(s)/deciliter  heparin   Injectable 7500 Unit(s) IV Push every 6 hours PRN For aPTT less than 40  heparin   Injectable 3500 Unit(s) IV Push every 6 hours PRN For aPTT between 40 - 57  melatonin 3 milliGRAM(s) Oral at bedtime PRN Insomnia  ondansetron Injectable 4 milliGRAM(s) IV Push every 8 hours PRN Nausea and/or Vomiting    CAPILLARY BLOOD GLUCOSE      POCT Blood Glucose.: 109 mg/dL (18 Dec 2024 08:18)  POCT Blood Glucose.: 193 mg/dL (17 Dec 2024 16:38)    I&O's Summary      PHYSICAL EXAM:  Vital Signs Last 24 Hrs  T(C): 36.7 (18 Dec 2024 08:46), Max: 36.7 (18 Dec 2024 08:46)  T(F): 98 (18 Dec 2024 08:46), Max: 98 (18 Dec 2024 08:46)  HR: 65 (18 Dec 2024 08:46) (65 - 89)  BP: 109/65 (18 Dec 2024 08:46) (109/65 - 180/74)  BP(mean): 97 (17 Dec 2024 22:06) (97 - 97)  RR: 19 (18 Dec 2024 08:46) (18 - 20)  SpO2: 97% (18 Dec 2024 08:46) (97% - 100%)    Parameters below as of 18 Dec 2024 08:46  Patient On (Oxygen Delivery Method): room air        CONSTITUTIONAL: NAD, well-developed, well-groomed  EYES:  EOMI, conjunctiva and sclera clear  ENMT: Moist oral mucosa  NECK: Supple, no JVD  RESPIRATORY: Normal respiratory effort; lungs are clear to auscultation bilaterally  CARDIOVASCULAR: Regular rate and rhythm, normal S1 and S2, no murmur/rub/gallop; No lower extremity edema  ABDOMEN: normoactive bowel sounds, soft, nontender to palpation, no distension   MUSCULOSKELETAL:  no clubbing or cyanosis of digits; no joint swelling or tenderness to palpation  PSYCH: A+O x3; affect appropriate, calm and cooperative  NEUROLOGY: CN 2-12 are intact and symmetric; no gross sensory deficits     LABS:                        9.1    5.40  )-----------( 337      ( 18 Dec 2024 05:51 )             27.7     12-18    141  |  104  |  47.8[H]  ----------------------------<  95  4.5   |  23.0  |  2.77[H]    Ca    9.0      18 Dec 2024 05:51    TPro  7.2  /  Alb  4.3  /  TBili  0.4  /  DBili  x   /  AST  25  /  ALT  17  /  AlkPhos  65  12-17    PT/INR - ( 17 Dec 2024 11:53 )   PT: 22.1 sec;   INR: 1.92 ratio         PTT - ( 18 Dec 2024 07:58 )  PTT:>200.0 sec      Urinalysis Basic - ( 18 Dec 2024 05:51 )    Color: x / Appearance: x / SG: x / pH: x  Gluc: 95 mg/dL / Ketone: x  / Bili: x / Urobili: x   Blood: x / Protein: x / Nitrite: x   Leuk Esterase: x / RBC: x / WBC x   Sq Epi: x / Non Sq Epi: x / Bacteria: x       [facility] Division of Hospital Medicine  Jazmin Chen, DO  I'm reachable on [employer] Teams    Patient is a 73y old  Female who presents with a chief complaint of dyspnea (18 Dec 2024 09:55)      SUBJECTIVE / OVERNIGHT EVENTS:  Patient was seen and examined at bedside today. She is in no acute distress. She denies any chest pain at this time along with any shortness of breath, abdominal pain, or any other acute symptoms. Patient understands that she is for cardiac catheterization. She offers no complaints at this time.    MEDICATIONS  (STANDING):  albuterol    0.083% 2.5 milliGRAM(s) Nebulizer every 6 hours  atorvastatin 40 milliGRAM(s) Oral at bedtime  dextrose 5%. 1000 milliLiter(s) (100 mL/Hr) IV Continuous <Continuous>  dextrose 5%. 1000 milliLiter(s) (50 mL/Hr) IV Continuous <Continuous>  dextrose 50% Injectable 25 Gram(s) IV Push once  dextrose 50% Injectable 12.5 Gram(s) IV Push once  dextrose 50% Injectable 25 Gram(s) IV Push once  ergocalciferol 84937 Unit(s) Oral <User Schedule>  furosemide    Tablet 40 milliGRAM(s) Oral daily  glucagon  Injectable 1 milliGRAM(s) IntraMuscular once  heparin  Infusion.  Unit(s)/Hr (17 mL/Hr) IV Continuous <Continuous>  hydrALAZINE 50 milliGRAM(s) Oral three times a day  insulin lispro (ADMELOG) corrective regimen sliding scale   SubCutaneous three times a day before meals  isosorbide   mononitrate ER Tablet (IMDUR) 60 milliGRAM(s) Oral daily  metoprolol succinate  milliGRAM(s) Oral daily    MEDICATIONS  (PRN):  acetaminophen     Tablet .. 650 milliGRAM(s) Oral every 6 hours PRN Temp greater or equal to 38C (100.4F), Mild Pain (1 - 3)  aluminum hydroxide/magnesium hydroxide/simethicone Suspension 30 milliLiter(s) Oral every 4 hours PRN Dyspepsia  dextrose Oral Gel 15 Gram(s) Oral once PRN Blood Glucose LESS THAN 70 milliGRAM(s)/deciliter  heparin   Injectable 7500 Unit(s) IV Push every 6 hours PRN For aPTT less than 40  heparin   Injectable 3500 Unit(s) IV Push every 6 hours PRN For aPTT between 40 - 57  melatonin 3 milliGRAM(s) Oral at bedtime PRN Insomnia  ondansetron Injectable 4 milliGRAM(s) IV Push every 8 hours PRN Nausea and/or Vomiting    CAPILLARY BLOOD GLUCOSE      POCT Blood Glucose.: 109 mg/dL (18 Dec 2024 08:18)  POCT Blood Glucose.: 193 mg/dL (17 Dec 2024 16:38)    I&O's Summary      PHYSICAL EXAM:  Vital Signs Last 24 Hrs  T(C): 36.7 (18 Dec 2024 08:46), Max: 36.7 (18 Dec 2024 08:46)  T(F): 98 (18 Dec 2024 08:46), Max: 98 (18 Dec 2024 08:46)  HR: 65 (18 Dec 2024 08:46) (65 - 89)  BP: 109/65 (18 Dec 2024 08:46) (109/65 - 180/74)  BP(mean): 97 (17 Dec 2024 22:06) (97 - 97)  RR: 19 (18 Dec 2024 08:46) (18 - 20)  SpO2: 97% (18 Dec 2024 08:46) (97% - 100%)    Parameters below as of 18 Dec 2024 08:46  Patient On (Oxygen Delivery Method): room air        CONSTITUTIONAL: NAD, well-developed, well-groomed  EYES:  EOMI, conjunctiva and sclera clear  ENMT: Moist oral mucosa  NECK: Supple, no JVD  RESPIRATORY: Normal respiratory effort; lungs are clear to auscultation bilaterally  CARDIOVASCULAR: Regular rate and rhythm, normal S1 and S2, no murmur/rub/gallop; No lower extremity edema  ABDOMEN: normoactive bowel sounds, soft, nontender to palpation, no distension   MUSCULOSKELETAL:  no clubbing or cyanosis of digits; no joint swelling or tenderness to palpation  PSYCH: A+O x3; affect appropriate, calm and cooperative  NEUROLOGY: CN 2-12 are intact and symmetric; no gross sensory deficits     LABS:                        9.1    5.40  )-----------( 337      ( 18 Dec 2024 05:51 )             27.7     12-18    141  |  104  |  47.8[H]  ----------------------------<  95  4.5   |  23.0  |  2.77[H]    Ca    9.0      18 Dec 2024 05:51    TPro  7.2  /  Alb  4.3  /  TBili  0.4  /  DBili  x   /  AST  25  /  ALT  17  /  AlkPhos  65  12-17    PT/INR - ( 17 Dec 2024 11:53 )   PT: 22.1 sec;   INR: 1.92 ratio         PTT - ( 18 Dec 2024 07:58 )  PTT:>200.0 sec      Urinalysis Basic - ( 18 Dec 2024 05:51 )    Color: x / Appearance: x / SG: x / pH: x  Gluc: 95 mg/dL / Ketone: x  / Bili: x / Urobili: x   Blood: x / Protein: x / Nitrite: x   Leuk Esterase: x / RBC: x / WBC x   Sq Epi: x / Non Sq Epi: x / Bacteria: x

## 2024-12-18 NOTE — CONSULT NOTE ADULT - ASSESSMENT
Assessment: 73 y.o. female remote former smoker with hx of PAF on AC, diabetes, hypertension, hyperlipidemia, recent admission at [facility] with dyspnea and leg edema, she was found to have elevated PASP on echo, underwent testing and her pulmonary hypertension was deemed being related to diastolic dysfunction and CHINO. Patient was eventually dc home however, gradually she began having increasing CHINO (when walking 20 feet or walking up 4 steps), BLE edema, fatigue, lightheadedness, and chest tightness. This morning, patient woke up with a feeling in her chest "like someone is sitting on my chest", chest tightness and SOB.    Problem List:   1. Unstable angina  ·	LHC and possible intervention. Consent obtained.  ·	Procedure explained and questions answered.   ·	Will start DAPT if PCI performed.  ·	IV:  mL IV over 1 hour pre and post procedure  ·	Aspirin if not taken today.  ·	Pt. assessed, appropriate for sedation, pt.  educated regarding the plan for Versed/fentanyl as needed    2. Acute on chronic HFpEF  ·	RHC  ·	GDMT  ·	     Beta Blocker: Will continue Toprol 100 mg daily  ·	     RAAS Inhibitor: CKD  ·	     MRA: N/A  ·	     Diuretic: Will continue Lasix 40 mg daily  ·	     SGLT2i: N/A  ·	     Other: Will continue Imdur 60 mg daily and hydralazine 50 mg TID  ·	Strict I&O's  ·	Daily standing weights (if able)    3. PAF  ·	CHADS2-Vasc: 5 for age, gender HFpEF, HTN, and DM  ·	Anticoagulation: Will resume heparin drip post procedure and restart Eliquis 5 mg BID before discharge  ·	Rate/Rhythm Control: Will continue Toprol 100 mg daily

## 2024-12-18 NOTE — PROGRESS NOTE ADULT - ASSESSMENT
Patient is a *** year old *** who is being managed as an inpatient for ***.    ***    DVT/GI ppx: Heparin gtt, will transition based on Cardiology recommendations  Diet: NPO for planned procedure; DASH diet otherwise  Code Status: Full code  Dispo: Home in 1-2 days, pending Cardiology recommendations Patient is a 73 year old female who is being managed as an inpatient for dyspnea. She has a PMH of HFpEF, PHTN, afib on AC, HTN, HLD, DM2, CKD, thyroidectomy. Cardiology was consulted and are planning for a cadiac cath today. Nephrology also has been following due to CKD and planned cath.    Dyspnea, likely secondary to HFpEF and PHTN  Rule out coronary artery disease  - Troponin negative x2  - Continue all cardiac medications for now and heparin gtt  - Discontinued albuterol as patient is not wheezing any more  - TTE done on 12/17 showing EF 70-75%  - Planned for cardiac catheterization today  - Follow up Cardiology recommendations    Atrial fibrillation  - Continue Toprol XL 100mg daily  - Continue heparin gtt as per Cardiology recommendations  - Transition to PO AC after cardiac cath    CKD, stage IV  Anemia of chronic disease  - Per Dr. Connor, Cr was 3 at [facility] during discharge  - Creatinine 2.77 today on chemistry  - Patient already received morning dose of Lasix today  - Monitor for worsening renal function given increased risk of JORGE A  - Follow up Nephrology recommendations    DM  - Hold glipizide  - Hemoglobin A1c 5.9  - Continue sliding scale insulin for now    HTN  - Continue hydralazine 50mg TID, Imdur 60mg daily, Toprol XL 100mg daily    HLD  - Continue atorvastatin 40mg HS    Thyroid nodule s/p thyroidectomy  - Does not take any medications for past two years  - Regularly follows up as an outpatient      DVT/GI ppx: Heparin gtt, will transition based on Cardiology recommendations  Diet: NPO for planned procedure; DASH diet otherwise  Code Status: Full code  Dispo: Home in 1-2 days, pending Cardiology recommendations

## 2024-12-18 NOTE — PROGRESS NOTE ADULT - SUBJECTIVE AND OBJECTIVE BOX
DEVANTE RODRIGUEZ  [id number]        Chief Complaint:  follow up CP/SOB      Subjective: no complaints      24 hour Tele: SR         acetaminophen     Tablet .. 650 milliGRAM(s) Oral every 6 hours PRN  albuterol    0.083% 2.5 milliGRAM(s) Nebulizer every 6 hours  aluminum hydroxide/magnesium hydroxide/simethicone Suspension 30 milliLiter(s) Oral every 4 hours PRN  atorvastatin 40 milliGRAM(s) Oral at bedtime  dextrose 5%. 1000 milliLiter(s) IV Continuous <Continuous>  dextrose 5%. 1000 milliLiter(s) IV Continuous <Continuous>  dextrose 50% Injectable 25 Gram(s) IV Push once  dextrose 50% Injectable 12.5 Gram(s) IV Push once  dextrose 50% Injectable 25 Gram(s) IV Push once  dextrose Oral Gel 15 Gram(s) Oral once PRN  ergocalciferol 15268 Unit(s) Oral <User Schedule>  furosemide    Tablet 40 milliGRAM(s) Oral daily  glucagon  Injectable 1 milliGRAM(s) IntraMuscular once  heparin   Injectable 7500 Unit(s) IV Push every 6 hours PRN  heparin   Injectable 3500 Unit(s) IV Push every 6 hours PRN  heparin  Infusion.  Unit(s)/Hr IV Continuous <Continuous>  hydrALAZINE 50 milliGRAM(s) Oral three times a day  insulin lispro (ADMELOG) corrective regimen sliding scale   SubCutaneous three times a day before meals  isosorbide   mononitrate ER Tablet (IMDUR) 60 milliGRAM(s) Oral daily  melatonin 3 milliGRAM(s) Oral at bedtime PRN  metoprolol succinate  milliGRAM(s) Oral daily  ondansetron Injectable 4 milliGRAM(s) IV Push every 8 hours PRN          Physical Exam:  T(C): 36.7 (12-18-24 @ 08:46), Max: 36.7 (12-18-24 @ 08:46)  HR: 65 (12-18-24 @ 08:46) (65 - 89)  BP: 109/65 (12-18-24 @ 08:46) (109/65 - 180/74)  RR: 19 (12-18-24 @ 08:46) (18 - 20)  SpO2: 97% (12-18-24 @ 08:46) (97% - 100%)  Wt(kg): --  General: Comfortable in NAD  HEENT: MMM, sclera anicteric  Resp: CTA bilaterally  CVS: nl s1s2, rrr, no s3/JVD  Abd: soft, NT, ND, BS+  Ext: No c/c/e  Neuro A&O x3  Psych: Normal affect    I&O's Summary        Labs:   18 Dec 2024 05:51    141    |  104    |  47.8   ----------------------------<  95     4.5     |  23.0   |  2.77     Ca    9.0        18 Dec 2024 05:51    TPro  7.2    /  Alb  4.3    /  TBili  0.4    /  DBili  x      /  AST  25     /  ALT  17     /  AlkPhos  65     17 Dec 2024 11:53                          9.1    5.40  )-----------( 337      ( 18 Dec 2024 05:51 )             27.7     PT/INR - ( 17 Dec 2024 11:53 )   PT: 22.1 sec;   INR: 1.92 ratio         PTT - ( 18 Dec 2024 07:58 )  PTT:>200.0 sec      EKG: NSR, nonspecific ST abnormalities.     ECHO [facility]: 12/2024  Echo with dilated RA, RV and moderate TR with PA pressure 60 mm Hg. Mild MR and normal LV systolic fx.     ECHO 12/2024:   1. Left ventricular systolic function is normal with an ejection fraction of 73 % by Trevino's method of disks with an ejection fraction visually estimated at 70 to 75 %.   2. The left ventricular diastolic function is indeterminate.   3. Normal right ventricular cavity size and normal right ventricular systolic function.   4. Estimated pulmonary artery systolic pressure is 56 mmHg, consistent with moderate pulmonary hypertension.   5. Left atrium is normal in size.   6. Mild to moderate tricuspid regurgitation.   7. There is mild calcification of the mitral valve annulus.   8. Fibrocalcific aortic valve sclerosis without stenosis.   9. Aortic calcification seen in the aortic root.  10. No pericardial effusion seen.      Assessment:  73 y.o. female with hx of PAF on AC,  diabetes, hypertension, hyperlipidemia, recent admission at [facility] with dyspnea and leg edema, she was found to have elevated PASP on echo, underwent testing and her pulmonary hypertension was deemed being related to diastolic dysfunction and CHINO. Patient was eventually dc home however, gradually she began having increasing CHINO and chest tightness. This morning, patient woke up with a 'sinking' feeling in her chest, chest tightness and SOB. Cardiology consultation requested for evaluation of dyspnea      -Appears euvolemic  -dyspnea of exertion maybe related to PHTN and CHINO, however obstructive CAD also a possibility given her increased ASCVD risk   -Repeat echop here with lower PAP, normal BiV function and no significant valve disease    Plan  (TO BE SEEN)   1. LHC/RHC today  2. Transition back to po A/C post cath  3. Appreciate renal assistance  4. Diuretics based on cath numbers  5.   6.              Aston Bridges MD DEVANTE RODRIGUEZ  [id number]        Chief Complaint:  follow up CP/SOB      Subjective: no complaints      24 hour Tele: SR         acetaminophen     Tablet .. 650 milliGRAM(s) Oral every 6 hours PRN  albuterol    0.083% 2.5 milliGRAM(s) Nebulizer every 6 hours  aluminum hydroxide/magnesium hydroxide/simethicone Suspension 30 milliLiter(s) Oral every 4 hours PRN  atorvastatin 40 milliGRAM(s) Oral at bedtime  dextrose 5%. 1000 milliLiter(s) IV Continuous <Continuous>  dextrose 5%. 1000 milliLiter(s) IV Continuous <Continuous>  dextrose 50% Injectable 25 Gram(s) IV Push once  dextrose 50% Injectable 12.5 Gram(s) IV Push once  dextrose 50% Injectable 25 Gram(s) IV Push once  dextrose Oral Gel 15 Gram(s) Oral once PRN  ergocalciferol 47284 Unit(s) Oral <User Schedule>  furosemide    Tablet 40 milliGRAM(s) Oral daily  glucagon  Injectable 1 milliGRAM(s) IntraMuscular once  heparin   Injectable 7500 Unit(s) IV Push every 6 hours PRN  heparin   Injectable 3500 Unit(s) IV Push every 6 hours PRN  heparin  Infusion.  Unit(s)/Hr IV Continuous <Continuous>  hydrALAZINE 50 milliGRAM(s) Oral three times a day  insulin lispro (ADMELOG) corrective regimen sliding scale   SubCutaneous three times a day before meals  isosorbide   mononitrate ER Tablet (IMDUR) 60 milliGRAM(s) Oral daily  melatonin 3 milliGRAM(s) Oral at bedtime PRN  metoprolol succinate  milliGRAM(s) Oral daily  ondansetron Injectable 4 milliGRAM(s) IV Push every 8 hours PRN          Physical Exam:  T(C): 36.7 (12-18-24 @ 08:46), Max: 36.7 (12-18-24 @ 08:46)  HR: 65 (12-18-24 @ 08:46) (65 - 89)  BP: 109/65 (12-18-24 @ 08:46) (109/65 - 180/74)  RR: 19 (12-18-24 @ 08:46) (18 - 20)  SpO2: 97% (12-18-24 @ 08:46) (97% - 100%)  Wt(kg): --  General: Comfortable in NAD  HEENT: MMM, sclera anicteric  Resp: CTA bilaterally  CVS: nl s1s2, rrr, no s3/JVD  Abd: soft, NT, ND, BS+  Ext: No c/c/e  Neuro A&O x3  Psych: Normal affect    I&O's Summary        Labs:   18 Dec 2024 05:51    141    |  104    |  47.8   ----------------------------<  95     4.5     |  23.0   |  2.77     Ca    9.0        18 Dec 2024 05:51    TPro  7.2    /  Alb  4.3    /  TBili  0.4    /  DBili  x      /  AST  25     /  ALT  17     /  AlkPhos  65     17 Dec 2024 11:53                          9.1    5.40  )-----------( 337      ( 18 Dec 2024 05:51 )             27.7     PT/INR - ( 17 Dec 2024 11:53 )   PT: 22.1 sec;   INR: 1.92 ratio         PTT - ( 18 Dec 2024 07:58 )  PTT:>200.0 sec      EKG: NSR, nonspecific ST abnormalities.     ECHO [facility]: 12/2024  Echo with dilated RA, RV and moderate TR with PA pressure 60 mm Hg. Mild MR and normal LV systolic fx.     ECHO 12/2024:   1. Left ventricular systolic function is normal with an ejection fraction of 73 % by Trevino's method of disks with an ejection fraction visually estimated at 70 to 75 %.   2. The left ventricular diastolic function is indeterminate.   3. Normal right ventricular cavity size and normal right ventricular systolic function.   4. Estimated pulmonary artery systolic pressure is 56 mmHg, consistent with moderate pulmonary hypertension.   5. Left atrium is normal in size.   6. Mild to moderate tricuspid regurgitation.   7. There is mild calcification of the mitral valve annulus.   8. Fibrocalcific aortic valve sclerosis without stenosis.   9. Aortic calcification seen in the aortic root.  10. No pericardial effusion seen.      Assessment:  73 y.o. female with hx of PAF on AC,  diabetes, hypertension, hyperlipidemia, recent admission at [facility] with dyspnea and leg edema, she was found to have elevated PASP on echo, underwent testing and her pulmonary hypertension was deemed being related to diastolic dysfunction and CHINO. Patient was eventually dc home however, gradually she began having increasing CHINO and chest tightness. This morning, patient woke up with a 'sinking' feeling in her chest, chest tightness and SOB. Cardiology consultation requested for evaluation of dyspnea      -Appears euvolemic  -dyspnea of exertion maybe related to PHTN, CHINO and HFpEF.  however obstructive CAD also a possibility given her increased ASCVD risk. LHC/RHC will help elucidate symptoms and etiology of PHTN   -Repeat echop here with lower PAP, normal BiV function and no significant valve disease    Plan  (TO BE SEEN)   1. LHC/RHC today  2. Transition back to po A/C post cath  3. Appreciate renal assistance  4. Diuretics based on cath numbers  5.   6.              Aston Bridges MD DEVANTE RODRIGUEZ  [id number]        Chief Complaint:  follow up CP/SOB      Subjective: no complaints      24 hour Tele: SR         acetaminophen     Tablet .. 650 milliGRAM(s) Oral every 6 hours PRN  albuterol    0.083% 2.5 milliGRAM(s) Nebulizer every 6 hours  aluminum hydroxide/magnesium hydroxide/simethicone Suspension 30 milliLiter(s) Oral every 4 hours PRN  atorvastatin 40 milliGRAM(s) Oral at bedtime  dextrose 5%. 1000 milliLiter(s) IV Continuous <Continuous>  dextrose 5%. 1000 milliLiter(s) IV Continuous <Continuous>  dextrose 50% Injectable 25 Gram(s) IV Push once  dextrose 50% Injectable 12.5 Gram(s) IV Push once  dextrose 50% Injectable 25 Gram(s) IV Push once  dextrose Oral Gel 15 Gram(s) Oral once PRN  ergocalciferol 30098 Unit(s) Oral <User Schedule>  furosemide    Tablet 40 milliGRAM(s) Oral daily  glucagon  Injectable 1 milliGRAM(s) IntraMuscular once  heparin   Injectable 7500 Unit(s) IV Push every 6 hours PRN  heparin   Injectable 3500 Unit(s) IV Push every 6 hours PRN  heparin  Infusion.  Unit(s)/Hr IV Continuous <Continuous>  hydrALAZINE 50 milliGRAM(s) Oral three times a day  insulin lispro (ADMELOG) corrective regimen sliding scale   SubCutaneous three times a day before meals  isosorbide   mononitrate ER Tablet (IMDUR) 60 milliGRAM(s) Oral daily  melatonin 3 milliGRAM(s) Oral at bedtime PRN  metoprolol succinate  milliGRAM(s) Oral daily  ondansetron Injectable 4 milliGRAM(s) IV Push every 8 hours PRN          Physical Exam:  T(C): 36.7 (12-18-24 @ 08:46), Max: 36.7 (12-18-24 @ 08:46)  HR: 65 (12-18-24 @ 08:46) (65 - 89)  BP: 109/65 (12-18-24 @ 08:46) (109/65 - 180/74)  RR: 19 (12-18-24 @ 08:46) (18 - 20)  SpO2: 97% (12-18-24 @ 08:46) (97% - 100%)  Wt(kg): --  General: Comfortable in NAD  HEENT: MMM, sclera anicteric  Resp: CTA bilaterally  CVS: nl s1s2, rrr, no s3/JVD  Abd: soft, NT, ND, BS+  Ext: No c/c/e  Neuro A&O x3  Psych: Normal affect    I&O's Summary        Labs:   18 Dec 2024 05:51    141    |  104    |  47.8   ----------------------------<  95     4.5     |  23.0   |  2.77     Ca    9.0        18 Dec 2024 05:51    TPro  7.2    /  Alb  4.3    /  TBili  0.4    /  DBili  x      /  AST  25     /  ALT  17     /  AlkPhos  65     17 Dec 2024 11:53                          9.1    5.40  )-----------( 337      ( 18 Dec 2024 05:51 )             27.7     PT/INR - ( 17 Dec 2024 11:53 )   PT: 22.1 sec;   INR: 1.92 ratio         PTT - ( 18 Dec 2024 07:58 )  PTT:>200.0 sec      EKG: NSR, nonspecific ST abnormalities.     ECHO [facility]: 12/2024  Echo with dilated RA, RV and moderate TR with PA pressure 60 mm Hg. Mild MR and normal LV systolic fx.     ECHO 12/2024:   1. Left ventricular systolic function is normal with an ejection fraction of 73 % by Trevino's method of disks with an ejection fraction visually estimated at 70 to 75 %.   2. The left ventricular diastolic function is indeterminate.   3. Normal right ventricular cavity size and normal right ventricular systolic function.   4. Estimated pulmonary artery systolic pressure is 56 mmHg, consistent with moderate pulmonary hypertension.   5. Left atrium is normal in size.   6. Mild to moderate tricuspid regurgitation.   7. There is mild calcification of the mitral valve annulus.   8. Fibrocalcific aortic valve sclerosis without stenosis.   9. Aortic calcification seen in the aortic root.  10. No pericardial effusion seen.      Assessment:  73 y.o. female with hx of PAF on AC,  diabetes, hypertension, hyperlipidemia, recent admission at [facility] with dyspnea and leg edema, she was found to have elevated PASP on echo, underwent testing and her pulmonary hypertension was deemed being related to diastolic dysfunction and CHINO. Patient was eventually dc home however, gradually she began having increasing CHINO and chest tightness. This morning, patient woke up with a 'sinking' feeling in her chest, chest tightness and SOB. Cardiology consultation requested for evaluation of dyspnea      -Appears euvolemic  -dyspnea of exertion maybe related to PHTN, CHINO and HFpEF.  however obstructive CAD also a possibility given her increased ASCVD risk. LHC/RHC will help elucidate symptoms and etiology of PHTN   -Repeat echop here with lower PAP, normal BiV function and no significant valve disease    Plan  (TO BE SEEN)   1. LHC/RHC today  2. Transition back to po A/C post cath, hold heparin for procedure   3. Appreciate renal assistance  4. Diuretics based on cath numbers  5.   6.              Aston Bridges MD DEVANTE RODRIGUEZ  [id number]        Chief Complaint:  follow up CP/SOB      Subjective: no complaints, nervous about cath       24 hour Tele: SR         acetaminophen     Tablet .. 650 milliGRAM(s) Oral every 6 hours PRN  albuterol    0.083% 2.5 milliGRAM(s) Nebulizer every 6 hours  aluminum hydroxide/magnesium hydroxide/simethicone Suspension 30 milliLiter(s) Oral every 4 hours PRN  atorvastatin 40 milliGRAM(s) Oral at bedtime  dextrose 5%. 1000 milliLiter(s) IV Continuous <Continuous>  dextrose 5%. 1000 milliLiter(s) IV Continuous <Continuous>  dextrose 50% Injectable 25 Gram(s) IV Push once  dextrose 50% Injectable 12.5 Gram(s) IV Push once  dextrose 50% Injectable 25 Gram(s) IV Push once  dextrose Oral Gel 15 Gram(s) Oral once PRN  ergocalciferol 03788 Unit(s) Oral <User Schedule>  furosemide    Tablet 40 milliGRAM(s) Oral daily  glucagon  Injectable 1 milliGRAM(s) IntraMuscular once  heparin   Injectable 7500 Unit(s) IV Push every 6 hours PRN  heparin   Injectable 3500 Unit(s) IV Push every 6 hours PRN  heparin  Infusion.  Unit(s)/Hr IV Continuous <Continuous>  hydrALAZINE 50 milliGRAM(s) Oral three times a day  insulin lispro (ADMELOG) corrective regimen sliding scale   SubCutaneous three times a day before meals  isosorbide   mononitrate ER Tablet (IMDUR) 60 milliGRAM(s) Oral daily  melatonin 3 milliGRAM(s) Oral at bedtime PRN  metoprolol succinate  milliGRAM(s) Oral daily  ondansetron Injectable 4 milliGRAM(s) IV Push every 8 hours PRN          Physical Exam:  T(C): 36.7 (12-18-24 @ 08:46), Max: 36.7 (12-18-24 @ 08:46)  HR: 65 (12-18-24 @ 08:46) (65 - 89)  BP: 109/65 (12-18-24 @ 08:46) (109/65 - 180/74)  RR: 19 (12-18-24 @ 08:46) (18 - 20)  SpO2: 97% (12-18-24 @ 08:46) (97% - 100%)  Wt(kg): --  General: Comfortable in NAD  HEENT: MMM, sclera anicteric  Resp: CTA bilaterally, mildly reduced air movement  CVS: nl s1s2, rrr, no s3/JVD  Abd: soft, NT, ND, obese  Ext: No c/c/e  Neuro A&O x3  Psych: Normal affect    I&O's Summary        Labs:   18 Dec 2024 05:51    141    |  104    |  47.8   ----------------------------<  95     4.5     |  23.0   |  2.77     Ca    9.0        18 Dec 2024 05:51    TPro  7.2    /  Alb  4.3    /  TBili  0.4    /  DBili  x      /  AST  25     /  ALT  17     /  AlkPhos  65     17 Dec 2024 11:53                          9.1    5.40  )-----------( 337      ( 18 Dec 2024 05:51 )             27.7     PT/INR - ( 17 Dec 2024 11:53 )   PT: 22.1 sec;   INR: 1.92 ratio         PTT - ( 18 Dec 2024 07:58 )  PTT:>200.0 sec      EKG: NSR, nonspecific ST abnormalities.     ECHO [facility]: 12/2024  Echo with dilated RA, RV and moderate TR with PA pressure 60 mm Hg. Mild MR and normal LV systolic fx.     ECHO 12/2024:   1. Left ventricular systolic function is normal with an ejection fraction of 73 % by Trevino's method of disks with an ejection fraction visually estimated at 70 to 75 %.   2. The left ventricular diastolic function is indeterminate.   3. Normal right ventricular cavity size and normal right ventricular systolic function.   4. Estimated pulmonary artery systolic pressure is 56 mmHg, consistent with moderate pulmonary hypertension.   5. Left atrium is normal in size.   6. Mild to moderate tricuspid regurgitation.   7. There is mild calcification of the mitral valve annulus.   8. Fibrocalcific aortic valve sclerosis without stenosis.   9. Aortic calcification seen in the aortic root.  10. No pericardial effusion seen.      Assessment:  73 y.o. female with hx of PAF on AC,  diabetes, hypertension, hyperlipidemia, recent admission at [facility] with dyspnea and leg edema, she was found to have elevated PASP on echo, underwent testing and her pulmonary hypertension was deemed being related to diastolic dysfunction and CHINO. Patient was eventually dc home however, gradually she began having increasing CHINO and chest tightness. This morning, patient woke up with a 'sinking' feeling in her chest, chest tightness and SOB. Cardiology consultation requested for evaluation of dyspnea      -Appears euvolemic n ow  -dyspnea of exertion maybe related to PHTN, CHINO and HFpEF.  however obstructive CAD also a possibility given her increased ASCVD risk. LHC/RHC will help elucidate symptoms and etiology of PHTN   -Repeat echop here with lower PAP, normal BiV function and no significant valve disease    Plan    1. LHC/RHC today  2. Transition back to po A/C post cath, hold heparin for procedure   3. Appreciate renal assistance  4. Diuretics based on cath numbers  5. Continue metoprolol, hydralazine and nitrates. BP controlled at this time  6. GDMT for HFpEF pending cath results                Aston Bridges MD

## 2024-12-18 NOTE — CONSULT NOTE ADULT - SUBJECTIVE AND OBJECTIVE BOX
[facility]                                              INTERVENTIONAL CARDIOLOGY AT [place] [address]                                             Telephone: [phone]. Fax:[phone]                                                       INTERVENTIONAL CARDIOLOGY CONSULTATION NOTE                                                                                             History obtained by: Patient and medical record  Community Cardiologist: N/A  Reason for Consultation: Evaluation for cardiac catheterization  Available pt records reviewed: Yes [ x ] No [  ]    Chief complaint:    Patient is a 73y old  Female who presents with a chief complaint of Dyspnea (18 Dec 2024 10:05)    HPI: 73 y.o. female with hx of PAF on AC, diabetes, hypertension, hyperlipidemia, recent admission at [facility] with dyspnea and leg edema, she was found to have elevated PASP on echo, underwent testing and her pulmonary hypertension was deemed being related to diastolic dysfunction and CHINO. Patient was eventually dc home however, gradually she began having increasing CHINO and chest tightness. This morning, patient woke up with a 'sinking' feeling in her chest, chest tightness and SOB.     Anginal Class:        Angina (Class): N/A       Ischemic Symptoms: CHINO    Heart Failure: Acute on chronic ACC/AHA stage HFpEF    PAST MEDICAL & SURGICAL HISTORY:  HTN (hypertension)  DM (diabetes mellitus)  Chronic diastolic congestive heart failure    FAMILY HISTORY:    Family History of Premature Cardiovascular Disease:  Yes [  ] No [  ]    Associated Risk Factors:        Frailty Assessment: (none/mild/mod/severe):       Cerebrovascular Disease: N/A       Chronic Lung Disease: N/A       Peripheral Arterial Disease: N/A       Chronic Kidney Disease (if yes, what is GFR): N/A       Uncontrolled Diabetes (if yes, what is HgbA1C or FBS): N/A       Poorly Controlled Hypertension (if yes, what is SBP): N/A       Morbid Obesity (if yes, what is BMI): N/A       History of Recent Ventricular Arrhythmia: N/A       Inability to Ambulate Safely: N/A       Need for Therapeutic Anticoagulation: N/A       Antiplatelet or Contrast Allergy: N/A    SOCIAL HISTORY:         Marital:        Occupation:        Smoking:        ETOH:        Drugs:        Caffeine:     Home Medications:  apixaban 5 mg oral tablet: 1 tab(s) orally 2 times a day (17 Dec 2024 14:55)  atorvastatin 40 mg oral tablet: 1 tab(s) orally once a day (at bedtime) (17 Dec 2024 14:55)  ergocalciferol 1.25 mg (50,000 intl units) oral capsule: 1 cap(s) orally once a week (17 Dec 2024 14:55)  famotidine 40 mg oral tablet: 1 tab(s) orally once a day (at bedtime) (17 Dec 2024 14:55)  furosemide 40 mg oral tablet: 1 tab(s) orally 3 times a week on MWF (17 Dec 2024 14:55)  glipiZIDE 5 mg oral tablet: 1 tab(s) orally once a day (17 Dec 2024 14:55)  hydrALAZINE 50 mg oral tablet: 1 tab(s) orally 3 times a day (17 Dec 2024 14:55)  isosorbide mononitrate 60 mg oral tablet, extended release: 1 tab(s) orally once a day (17 Dec 2024 14:55)  metoprolol succinate 100 mg oral tablet, extended release: 1 tab(s) orally once a day (17 Dec 2024 14:55)    MEDICATIONS  (STANDING):  atorvastatin 40 milliGRAM(s) Oral at bedtime  dextrose 5%. 1000 milliLiter(s) (100 mL/Hr) IV Continuous <Continuous>  dextrose 5%. 1000 milliLiter(s) (50 mL/Hr) IV Continuous <Continuous>  dextrose 50% Injectable 25 Gram(s) IV Push once  dextrose 50% Injectable 12.5 Gram(s) IV Push once  dextrose 50% Injectable 25 Gram(s) IV Push once  ergocalciferol 19013 Unit(s) Oral <User Schedule>  furosemide    Tablet 40 milliGRAM(s) Oral daily  glucagon  Injectable 1 milliGRAM(s) IntraMuscular once  hydrALAZINE 50 milliGRAM(s) Oral three times a day  insulin lispro (ADMELOG) corrective regimen sliding scale   SubCutaneous three times a day before meals  isosorbide   mononitrate ER Tablet (IMDUR) 60 milliGRAM(s) Oral daily  metoprolol succinate  milliGRAM(s) Oral daily    MEDICATIONS  (PRN):  acetaminophen     Tablet .. 650 milliGRAM(s) Oral every 6 hours PRN Temp greater or equal to 38C (100.4F), Mild Pain (1 - 3)  aluminum hydroxide/magnesium hydroxide/simethicone Suspension 30 milliLiter(s) Oral every 4 hours PRN Dyspepsia  dextrose Oral Gel 15 Gram(s) Oral once PRN Blood Glucose LESS THAN 70 milliGRAM(s)/deciliter  melatonin 3 milliGRAM(s) Oral at bedtime PRN Insomnia  ondansetron Injectable 4 milliGRAM(s) IV Push every 8 hours PRN Nausea and/or Vomiting    Antianginal Therapies:        Beta Blockers:  Toprol 100 mg daily       Calcium Channel Blockers: N/A       Long Acting Nitrates: Imdur 60 mg daily       Ranexa: N/A    ALLERGIES:   No Known Allergies    REVIEW OF SYMPTOMS:   CONSTITUTIONAL: No fever, no chills, no weight loss, no weight gain, no fatigue   CARDIOVASCULAR: No chest pain, no CHINO, no orthopnea, no PND, no palpitations, no edema  RESPIRATORY: no Shortness of breath, no cough, no wheezing  : No dysuria, no hematuria   GI: No dark color stool, no nausea, no diarrhea, no constipation, no abdominal pain   NEURO: No headache, no slurred speech   ALL OTHER REVIEW OF SYSTEMS ARE NEGATIVE.    VITAL SIGNS:  T(C): 36.7 (12-18-24 @ 08:46), Max: 36.7 (12-18-24 @ 08:46)  T(F): 98 (12-18-24 @ 08:46), Max: 98 (12-18-24 @ 08:46)  HR: 70 (12-18-24 @ 13:13) (65 - 89)  BP: 125/62 (12-18-24 @ 13:13) (109/65 - 164/76)  RR: 19 (12-18-24 @ 08:46) (18 - 20)  SpO2: 98% (12-18-24 @ 10:10) (97% - 100%)    INTAKE AND OUTPUT:   12-18 @ 07:01  -  12-18 @ 15:28  --------------------------------------------------------  IN: 0 mL / OUT: 500 mL / NET: -500 mL    PHYSICAL EXAM:  Constitutional: Comfortable . No acute distress.   HEENT: Atraumatic and normocephalic , neck is supple . no JVD. No carotid bruit.  CNS: A&Ox3. No focal deficits.   Respiratory: CTAB, unlabored   Cardiovascular: RRR normal s1 s2. No murmur. No rubs or gallop.  Gastrointestinal: Soft, non-tender. +Bowel sounds.   Extremities: 2+ Peripheral Pulses, No clubbing, cyanosis, or edema  Psychiatric: Calm . no agitation.   Skin: Warm and dry, no ulcers on extremities     LABS:                            9.1    5.40  )-----------( 337      ( 18 Dec 2024 05:51 )             27.7     12-18    141  |  104  |  47.8[H]  ----------------------------<  95  4.5   |  23.0  |  2.77[H]    Ca    9.0      18 Dec 2024 05:51    TPro  7.2  /  Alb  4.3  /  TBili  0.4  /  DBili  x   /  AST  25  /  ALT  17  /  AlkPhos  65  12-17    PT/INR - ( 17 Dec 2024 11:53 )   PT: 22.1 sec;   INR: 1.92 ratio    PTT - ( 18 Dec 2024 07:58 )  PTT:>200.0 sec    Thyroid Stimulating Hormone, Serum: 4.82 uIU/mL (12-18-24 @ 05:51)        ECG:   Prior ECG: Yes [  ] No [  ]    CARDIAC TESTING   ECHO: 12/17/2024  ·	Left Ventricle: The left ventricular cavity is normal in size. Left ventricular wall thickness is normal. Left ventricular systolic function is normal with a calculated ejection fraction of 73 % by the Trevino's biplane method of disks. The left ventricular diastolic function is indeterminate.  ·	Right Ventricle: The right ventricular cavity is normal in size and right ventricular systolic function is normal. Tricuspid annular plane systolic excursion (TAPSE) is 2.4 cm (normal >=1.7 cm). Tricuspid annular tissue Doppler S' is 17.5 cm/s (normal >10 cm/s).  ·	Left Atrium: The left atrium is normal in size with an indexed volume of 24.66 ml/m².  ·	Right Atrium: The right atrium is normal in size.  ·	Interatrial Septum: Lipomatous interatrial septal hypertrophy present.  ·	Aortic Valve: The aortic valve appears trileaflet with normal systolic excursion. There is mild calcification of the aortic valve leaflets. There is fibrocalcific aortic valve sclerosis without stenosis. There is no evidence of aortic regurgitation.  ·	Mitral Valve: Structurally normal mitral valve with normal leaflet excursion. There is mild calcification of the mitral valve annulus. There is trace mitral regurgitation.  ·	Tricuspid Valve: Structurally normal tricuspid valve with normal leaflet excursion. There is mild to moderate tricuspid regurgitation. Estimated pulmonary artery systolic pressure is 56 mmHg, consistent with moderate pulmonary hypertension.  ·	Pulmonic Valve: Structurally normal pulmonic valve with normal leaflet excursion. There is trace pulmonic regurgitation.  ·	Pulmonary Artery: The main pulmonary artery is normal in size, origin, and position.  ·	Aorta: The aortic root appears normal in size. The aortic root at the sinuses of Valsalva is normal in size, measuring 2.40 cm (indexed 1.21 cm/m²). The ascending aorta is normal in size, measuring 2.70 cm (indexed 1.36 cm/m²). There is aortic calcification seen in the aortic root.  ·	Pericardium: No pericardial effusion seen.  ·	Systemic Veins: The inferior vena cava is dilated measuring 2.60 cm in diameter, (dilated >2.1cm) with normal inspiratory collapse (normal >50%) consistent with mildly elevated right atrial pressure (~8, range 5-10mmHg).    STRESS: N/A    Cardiac Interventions: N/A    CATH: N/A    ELECTROPHYSIOLOGY: N/A    Cardiac Cath Risk Assessments:  ASA: 3  Mallampati: 2  Bleeding Risk: 8.6%  Creatinine: 2.77  GFR: 18                                                [facility]                                              INTERVENTIONAL CARDIOLOGY AT [place] [address]                                             Telephone: [phone]. Fax:[phone]                                                       INTERVENTIONAL CARDIOLOGY CONSULTATION NOTE                                                                                             History obtained by: Patient and medical record  Community Cardiologist: N/A  Reason for Consultation: Evaluation for cardiac catheterization  Available pt records reviewed: Yes [ x ] No [  ]    Chief complaint:    Patient is a 73y old  Female who presents with a chief complaint of Dyspnea (18 Dec 2024 10:05)    HPI: 73 y.o. female remote former smoker with hx of PAF on AC, diabetes, hypertension, hyperlipidemia, recent admission at [facility] with dyspnea and leg edema, she was found to have elevated PASP on echo, underwent testing and her pulmonary hypertension was deemed being related to diastolic dysfunction and CHINO. Patient was eventually dc home however, gradually she began having increasing CHINO (when walking 20 feet or walking up 4 steps), BLE edema, fatigue, lightheadedness, and chest tightness. This morning, patient woke up with a feeling in her chest "like someone is sitting on my chest", chest tightness and SOB.     Anginal Class:        Angina (Class): N/A       Ischemic Symptoms: CHINO    Heart Failure: Acute on chronic ACC/AHA stage HFpEF    PAST MEDICAL & SURGICAL HISTORY:  HTN (hypertension)  DM (diabetes mellitus)  Chronic diastolic congestive heart failure  Nodular thyroid disease  CKD (chronic kidney disease)  History of thyroid surgery  History of  section    FAMILY HISTORY:  Family history of coronary artery bypass surgery (Mother)    Family History of Premature Cardiovascular Disease:  Yes [  ] No [X]    Associated Risk Factors:        Frailty Assessment: (none/mild/mod/severe):       Cerebrovascular Disease: N/A       Chronic Lung Disease: N/A       Peripheral Arterial Disease: N/A       Chronic Kidney Disease (if yes, what is GFR): N/A       Uncontrolled Diabetes (if yes, what is HgbA1C or FBS): N/A       Poorly Controlled Hypertension (if yes, what is SBP): N/A       Morbid Obesity (if yes, what is BMI): N/A       History of Recent Ventricular Arrhythmia: N/A       Inability to Ambulate Safely: N/A       Need for Therapeutic Anticoagulation: N/A       Antiplatelet or Contrast Allergy: N/A    SOCIAL HISTORY:         Marital: Single, lives alone. has family nearby       Smoking: Former 1 ppd smoker for 19 years, quit 40 years ago       ETOH: Rare       Drugs: Denies       Caffeine: Decaf    Home Medications:  apixaban 5 mg oral tablet: 1 tab(s) orally 2 times a day (17 Dec 2024 14:55)  atorvastatin 40 mg oral tablet: 1 tab(s) orally once a day (at bedtime) (17 Dec 2024 14:55)  ergocalciferol 1.25 mg (50,000 intl units) oral capsule: 1 cap(s) orally once a week (17 Dec 2024 14:55)  famotidine 40 mg oral tablet: 1 tab(s) orally once a day (at bedtime) (17 Dec 2024 14:55)  furosemide 40 mg oral tablet: 1 tab(s) orally 3 times a week on  (17 Dec 2024 14:55)  glipiZIDE 5 mg oral tablet: 1 tab(s) orally once a day (17 Dec 2024 14:55)  hydrALAZINE 50 mg oral tablet: 1 tab(s) orally 3 times a day (17 Dec 2024 14:55)  isosorbide mononitrate 60 mg oral tablet, extended release: 1 tab(s) orally once a day (17 Dec 2024 14:55)  metoprolol succinate 100 mg oral tablet, extended release: 1 tab(s) orally once a day (17 Dec 2024 14:55)    MEDICATIONS  (STANDING):  atorvastatin 40 milliGRAM(s) Oral at bedtime  dextrose 5%. 1000 milliLiter(s) (100 mL/Hr) IV Continuous <Continuous>  dextrose 5%. 1000 milliLiter(s) (50 mL/Hr) IV Continuous <Continuous>  dextrose 50% Injectable 25 Gram(s) IV Push once  dextrose 50% Injectable 12.5 Gram(s) IV Push once  dextrose 50% Injectable 25 Gram(s) IV Push once  ergocalciferol 35417 Unit(s) Oral <User Schedule>  furosemide    Tablet 40 milliGRAM(s) Oral daily  glucagon  Injectable 1 milliGRAM(s) IntraMuscular once  hydrALAZINE 50 milliGRAM(s) Oral three times a day  insulin lispro (ADMELOG) corrective regimen sliding scale   SubCutaneous three times a day before meals  isosorbide   mononitrate ER Tablet (IMDUR) 60 milliGRAM(s) Oral daily  metoprolol succinate  milliGRAM(s) Oral daily    MEDICATIONS  (PRN):  acetaminophen     Tablet .. 650 milliGRAM(s) Oral every 6 hours PRN Temp greater or equal to 38C (100.4F), Mild Pain (1 - 3)  aluminum hydroxide/magnesium hydroxide/simethicone Suspension 30 milliLiter(s) Oral every 4 hours PRN Dyspepsia  dextrose Oral Gel 15 Gram(s) Oral once PRN Blood Glucose LESS THAN 70 milliGRAM(s)/deciliter  melatonin 3 milliGRAM(s) Oral at bedtime PRN Insomnia  ondansetron Injectable 4 milliGRAM(s) IV Push every 8 hours PRN Nausea and/or Vomiting    Antianginal Therapies:        Beta Blockers:  Toprol 100 mg daily       Calcium Channel Blockers: N/A       Long Acting Nitrates: Imdur 60 mg daily       Ranexa: N/A    ALLERGIES:   No Known Allergies    REVIEW OF SYMPTOMS:   CONSTITUTIONAL: No fever, no chills, no weight loss, no weight gain, no fatigue   CARDIOVASCULAR: No chest pain, no CHINO, no orthopnea, no PND, no palpitations, no edema  RESPIRATORY: no Shortness of breath, no cough, no wheezing  : No dysuria, no hematuria   GI: No dark color stool, no nausea, no diarrhea, no constipation, no abdominal pain   NEURO: No headache, no slurred speech   ALL OTHER REVIEW OF SYSTEMS ARE NEGATIVE.    VITAL SIGNS:  T(C): 36.7 (24 @ 08:46), Max: 36.7 (24 @ 08:46)  T(F): 98 (24 @ 08:46), Max: 98 (24 @ 08:46)  HR: 70 (24 @ 13:13) (65 - 89)  BP: 125/62 (24 @ 13:13) (109/65 - 164/76)  RR: 19 (24 @ 08:46) (18 - 20)  SpO2: 98% (24 @ 10:10) (97% - 100%)    INTAKE AND OUTPUT:    @ 07:01  -   @ 15:28  --------------------------------------------------------  IN: 0 mL / OUT: 500 mL / NET: -500 mL    PHYSICAL EXAM:  Constitutional: Comfortable . No acute distress.   HEENT: Atraumatic and normocephalic , neck is supple . no JVD. No carotid bruit.  CNS: A&Ox3. No focal deficits.   Respiratory: CTAB, unlabored   Cardiovascular: RRR normal s1 s2. No murmur. No rubs or gallop.  Gastrointestinal: Soft, non-tender. +Bowel sounds.   Extremities: 2+ Peripheral Pulses, No clubbing, cyanosis, or edema  Psychiatric: Calm . no agitation.   Skin: Warm and dry, no ulcers on extremities     LABS:                            9.1    5.40  )-----------( 337      ( 18 Dec 2024 05:51 )             27.7         141  |  104  |  47.8[H]  ----------------------------<  95  4.5   |  23.0  |  2.77[H]    Ca    9.0      18 Dec 2024 05:51    TPro  7.2  /  Alb  4.3  /  TBili  0.4  /  DBili  x   /  AST  25  /  ALT  17  /  AlkPhos  65      PT/INR - ( 17 Dec 2024 11:53 )   PT: 22.1 sec;   INR: 1.92 ratio    PTT - ( 18 Dec 2024 07:58 )  PTT:>200.0 sec    Thyroid Stimulating Hormone, Serum: 4.82 uIU/mL (24 @ 05:51)        ECG:   Prior ECG: Yes [  ] No [  ]    CARDIAC TESTING   ECHO: 2024  ·	Left Ventricle: The left ventricular cavity is normal in size. Left ventricular wall thickness is normal. Left ventricular systolic function is normal with a calculated ejection fraction of 73 % by the Trevino's biplane method of disks. The left ventricular diastolic function is indeterminate.  ·	Right Ventricle: The right ventricular cavity is normal in size and right ventricular systolic function is normal. Tricuspid annular plane systolic excursion (TAPSE) is 2.4 cm (normal >=1.7 cm). Tricuspid annular tissue Doppler S' is 17.5 cm/s (normal >10 cm/s).  ·	Left Atrium: The left atrium is normal in size with an indexed volume of 24.66 ml/m².  ·	Right Atrium: The right atrium is normal in size.  ·	Interatrial Septum: Lipomatous interatrial septal hypertrophy present.  ·	Aortic Valve: The aortic valve appears trileaflet with normal systolic excursion. There is mild calcification of the aortic valve leaflets. There is fibrocalcific aortic valve sclerosis without stenosis. There is no evidence of aortic regurgitation.  ·	Mitral Valve: Structurally normal mitral valve with normal leaflet excursion. There is mild calcification of the mitral valve annulus. There is trace mitral regurgitation.  ·	Tricuspid Valve: Structurally normal tricuspid valve with normal leaflet excursion. There is mild to moderate tricuspid regurgitation. Estimated pulmonary artery systolic pressure is 56 mmHg, consistent with moderate pulmonary hypertension.  ·	Pulmonic Valve: Structurally normal pulmonic valve with normal leaflet excursion. There is trace pulmonic regurgitation.  ·	Pulmonary Artery: The main pulmonary artery is normal in size, origin, and position.  ·	Aorta: The aortic root appears normal in size. The aortic root at the sinuses of Valsalva is normal in size, measuring 2.40 cm (indexed 1.21 cm/m²). The ascending aorta is normal in size, measuring 2.70 cm (indexed 1.36 cm/m²). There is aortic calcification seen in the aortic root.  ·	Pericardium: No pericardial effusion seen.  ·	Systemic Veins: The inferior vena cava is dilated measuring 2.60 cm in diameter, (dilated >2.1cm) with normal inspiratory collapse (normal >50%) consistent with mildly elevated right atrial pressure (~8, range 5-10mmHg).    STRESS: N/A    Cardiac Interventions: N/A    CATH: N/A    ELECTROPHYSIOLOGY: N/A    Cardiac Cath Risk Assessments:  ASA: 3  Mallampati: 2  Bleeding Risk: 8.6%  Creatinine: 2.77  GFR: 18

## 2024-12-18 NOTE — DISCHARGE NOTE PROVIDER - ATTENDING DISCHARGE PHYSICAL EXAMINATION:
CONSTITUTIONAL: NAD, well-developed, well-groomed  EYES:  EOMI, conjunctiva and sclera clear  ENMT: Moist oral mucosa  NECK: Supple, no JVD  RESPIRATORY: Normal respiratory effort; lungs are clear to auscultation bilaterally  CARDIOVASCULAR: Regular rate and rhythm, normal S1 and S2, no murmur/rub/gallop; No lower extremity edema  ABDOMEN: normoactive bowel sounds, soft, nontender to palpation, no distension   MUSCULOSKELETAL:  no clubbing or cyanosis of digits; no joint swelling or tenderness to palpation  PSYCH: A+O x3; affect appropriate, calm and cooperative  NEUROLOGY: CN 2-12 are intact and symmetric; no gross sensory deficits

## 2024-12-18 NOTE — DISCHARGE NOTE PROVIDER - CARE PROVIDER_API CALL
Perez Amaya  Nephrology  [address] [place] [address]  Phone: [phone]  Fax: [phone]  Follow Up Time:

## 2024-12-18 NOTE — PROGRESS NOTE ADULT - SUBJECTIVE AND OBJECTIVE BOX
Department of Cardiology                                                                  [facility]/[place] [address] [address]                                                            Telephone: [phone]. Fax:[phone]                                                        INTERVENTIONAL CARDIOLOGY CATHETERIZATION NOTE     Subjective:  73y  Female who had a left heart catheterization which showed (official report pending):       Coronary Circulation: Left dominant       LM: No significant CAD       LAD: 40% mLAD stenosis       LCX: Mild CAD       RCA: No significant CAD  Right Heart Pressures:       RA: 7       RV: 41/2       PA: 44/12 (23)       PCWP: 10       CO: 14.92 LPM       CI: 7.48 LPM/m2         Proceduralist: Rusty Matos       Access/Hemostasis: Right radial artery (Radial band)       Total Contrast: 10 mL Omnipaque       Total Heparin: 4,000 units       Antiplatelet Given: N/A    PAST MEDICAL & SURGICAL HISTORY:  HTN (hypertension)  DM (diabetes mellitus)  Chronic diastolic congestive heart failure  Nodular thyroid disease  CKD (chronic kidney disease)  History of thyroid surgery  History of  section    FAMILY HISTORY:  Family history of coronary artery bypass surgery (Mother)    Home Medications:  apixaban 5 mg oral tablet: 1 tab(s) orally 2 times a day (17 Dec 2024 14:55)  atorvastatin 40 mg oral tablet: 1 tab(s) orally once a day (at bedtime) (17 Dec 2024 14:55)  ergocalciferol 1.25 mg (50,000 intl units) oral capsule: 1 cap(s) orally once a week (17 Dec 2024 14:55)  famotidine 40 mg oral tablet: 1 tab(s) orally once a day (at bedtime) (17 Dec 2024 14:55)  furosemide 40 mg oral tablet: 1 tab(s) orally 3 times a week on MWF (17 Dec 2024 14:55)  glipiZIDE 5 mg oral tablet: 1 tab(s) orally once a day (17 Dec 2024 14:55)  hydrALAZINE 50 mg oral tablet: 1 tab(s) orally 3 times a day (17 Dec 2024 14:55)  isosorbide mononitrate 60 mg oral tablet, extended release: 1 tab(s) orally once a day (17 Dec 2024 14:55)  metoprolol succinate 100 mg oral tablet, extended release: 1 tab(s) orally once a day (17 Dec 2024 14:55)    Patient is a 73y old  Female who presents with a chief complaint of dyspnea (18 Dec 2024 16:02)    HPI: 73 y.o. female remote former smoker with hx of PAF on AC, diabetes, hypertension, hyperlipidemia, recent admission at [facility] with dyspnea and leg edema, she was found to have elevated PASP on echo, underwent testing and her pulmonary hypertension was deemed being related to diastolic dysfunction and CHINO. Patient was eventually dc home however, gradually she began having increasing CHINO (when walking 20 feet or walking up 4 steps), BLE edema, fatigue, lightheadedness, and chest tightness. This morning, patient woke up with a feeling in her chest "like someone is sitting on my chest", chest tightness and SOB.     General: No fatigue, no fevers/chills  Respiratory: No dyspnea, no cough, no wheeze  CV: No chest pain, no palpitations  Abd: No nausea  Neuro: No headache, no dizziness    No Known Allergies    Objective:  Vital Signs Last 24 Hrs  T(C): 36.5 (18 Dec 2024 18:10), Max: 36.7 (18 Dec 2024 08:46)  T(F): 97.7 (18 Dec 2024 18:10), Max: 98 (18 Dec 2024 08:46)  HR: 70 (18 Dec 2024 18:30) (65 - 78)  BP: 143/72 (18 Dec 2024 18:30) (109/65 - 164/76)  BP(mean): 97 (17 Dec 2024 22:06) (97 - 97)  RR: 18 (18 Dec 2024 18:30) (14 - 19)  SpO2: 99% (18 Dec 2024 18:30) (97% - 100%)    Parameters below as of 18 Dec 2024 18:30  Patient On (Oxygen Delivery Method): room air    CM: SR  Neuro: A&OX3, CN 2-12 intact  HEENT: NC, AT  Lungs: CTA B/L  CV: S1, S2, no murmur, RRR  Abd: Soft  Extremity: Right radial band, right brachial sheath: no bleeding, fingers warm with good cap refil                            9.1    5.40  )-----------( 337      ( 18 Dec 2024 05:51 )             27.7     12-18    141  |  104  |  47.8[H]  ----------------------------<  95  4.5   |  23.0  |  2.77[H]    Ca    9.0      18 Dec 2024 05:51    TPro  7.2  /  Alb  4.3  /  TBili  0.4  /  DBili  x   /  AST  25  /  ALT  17  /  AlkPhos  65  12-17    PT/INR - ( 17 Dec 2024 11:53 )   PT: 22.1 sec;   INR: 1.92 ratio    PTT - ( 18 Dec 2024 07:58 )  PTT:>200.0 sec

## 2024-12-18 NOTE — DISCHARGE NOTE PROVIDER - NSDCFUADDAPPT_GEN_ALL_CORE_FT
APPTS ARE READY TO BE MADE: [X] YES    Best Family or Patient Contact (if needed):    Additional Information about above appointments (if needed):    1: Follow up with PCP in three days for repeat BMP  2: Follow up with Cardiology in two weeks  3:     Other comments or requests:    APPTS ARE READY TO BE MADE: [X] YES    Best Family or Patient Contact (if needed):    Additional Information about above appointments (if needed):    1: Follow up with Nephrology in 1 week (Dr. Amaya; [facility])  2: Follow up with Cardiology in 1 week ([facility], Dr. Galvan)  3: Follow up with PCP to obtain Pulmonology referral if desired by patient    Other comments or requests:    APPTS ARE READY TO BE MADE: [X] YES    Best Family or Patient Contact (if needed):    Additional Information about above appointments (if needed):    1: Follow up with Nephrology in 1 week (Dr. Amaya; [facility])  2: Follow up with Cardiology in 1 week ([facility], Dr. Galvan)  3: Follow up with PCP to obtain Pulmonology referral if desired by patient    Other comments or requests:     Appointment was scheduled by our team on the patient's behalf through the provider's office. Dr. Galvan 1/27/25 at 11am  [address] [place] [address] [phone] office will call with sooner appointment.    Appointment was scheduled but is not visible on [employer]. Jan 8th at 11:15am. Dr. Amaya [address] [place] [address]

## 2024-12-18 NOTE — PROGRESS NOTE ADULT - SUBJECTIVE AND OBJECTIVE BOX
NEPHROLOGY INTERVAL HPI/OVERNIGHT EVENTS:    Examined earlier  No distress  Feels ok  denies HA CP no SOB  Dtr visiting    MEDICATIONS  (STANDING):  atorvastatin 40 milliGRAM(s) Oral at bedtime  dextrose 5%. 1000 milliLiter(s) (100 mL/Hr) IV Continuous <Continuous>  dextrose 5%. 1000 milliLiter(s) (50 mL/Hr) IV Continuous <Continuous>  dextrose 50% Injectable 25 Gram(s) IV Push once  dextrose 50% Injectable 12.5 Gram(s) IV Push once  dextrose 50% Injectable 25 Gram(s) IV Push once  ergocalciferol 68882 Unit(s) Oral <User Schedule>  furosemide    Tablet 40 milliGRAM(s) Oral daily  glucagon  Injectable 1 milliGRAM(s) IntraMuscular once  hydrALAZINE 50 milliGRAM(s) Oral three times a day  insulin lispro (ADMELOG) corrective regimen sliding scale   SubCutaneous three times a day before meals  isosorbide   mononitrate ER Tablet (IMDUR) 60 milliGRAM(s) Oral daily  metoprolol succinate  milliGRAM(s) Oral daily    MEDICATIONS  (PRN):  acetaminophen     Tablet .. 650 milliGRAM(s) Oral every 6 hours PRN Temp greater or equal to 38C (100.4F), Mild Pain (1 - 3)  aluminum hydroxide/magnesium hydroxide/simethicone Suspension 30 milliLiter(s) Oral every 4 hours PRN Dyspepsia  dextrose Oral Gel 15 Gram(s) Oral once PRN Blood Glucose LESS THAN 70 milliGRAM(s)/deciliter  melatonin 3 milliGRAM(s) Oral at bedtime PRN Insomnia  ondansetron Injectable 4 milliGRAM(s) IV Push every 8 hours PRN Nausea and/or Vomiting      Allergies    No Known Allergies    Intolerances        Vital Signs Last 24 Hrs  T(C): 36.4 (18 Dec 2024 15:47), Max: 36.7 (18 Dec 2024 08:46)  T(F): 97.5 (18 Dec 2024 15:47), Max: 98 (18 Dec 2024 08:46)  HR: 72 (18 Dec 2024 15:47) (65 - 89)  BP: 148/61 (18 Dec 2024 15:47) (109/65 - 164/76)  BP(mean): 97 (17 Dec 2024 22:06) (97 - 97)  RR: 14 (18 Dec 2024 15:47) (14 - 20)  SpO2: 98% (18 Dec 2024 15:47) (97% - 100%)    Parameters below as of 18 Dec 2024 15:47  Patient On (Oxygen Delivery Method): room air      Daily Height in cm: 165.1 (18 Dec 2024 15:47)    Daily Weight in k.8 (18 Dec 2024 04:48)    PHYSICAL EXAM:  GENERAL: fatigued, NAD  HEAD: NCAT  EYES: EOMI  ENMT: Moist MMs  NECK: Supple, + JVD  NERVOUS SYSTEM:  Alert & Oriented X3  CHEST/LUNG: Diminished BS with crackles  HEART: Regular rate and rhythm; No rub  ABDOMEN: Soft, Nontender, +BS  EXTREMITIES: + LE dependent edema    LABS:                        9.1    5.40  )-----------( 337      ( 18 Dec 2024 05:51 )             27.7     12-18    141  |  104  |  47.8[H]  ----------------------------<  95  4.5   |  23.0  |  2.77[H]    Ca    9.0      18 Dec 2024 05:51    TPro  7.2  /  Alb  4.3  /  TBili  0.4  /  DBili  x   /  AST  25  /  ALT  17  /  AlkPhos  65  12-17    PT/INR - ( 17 Dec 2024 11:53 )   PT: 22.1 sec;   INR: 1.92 ratio         PTT - ( 18 Dec 2024 07:58 )  PTT:>200.0 sec  Urinalysis Basic - ( 18 Dec 2024 05:51 )    Color: x / Appearance: x / SG: x / pH: x  Gluc: 95 mg/dL / Ketone: x  / Bili: x / Urobili: x   Blood: x / Protein: x / Nitrite: x   Leuk Esterase: x / RBC: x / WBC x   Sq Epi: x / Non Sq Epi: x / Bacteria: x              RADIOLOGY & ADDITIONAL TESTS:

## 2024-12-18 NOTE — PROGRESS NOTE ADULT - ASSESSMENT
72 yo Female PMH + CKD due to DM and HTN who presents to the ED with CP and progressive SOB.     CKD suspect stage III +DM, HTN   Looking back at [facility] labs cr has been approx 1.2- 1.3 in past  Pt also has h/o uncontrolled DM w HgA1C of 9 one yr ago  Admitted with CP/SOB, known CAD  (?) CHF component  Pt scheduled for cardiac cath today   - diuretics as needed and accept azotemia--> if possible, hold diuretics prior to cardiac cath  - pt and daughter aware she is at elevated risk for JORGE A/ ATN and worsening renal function post procedure==> pt would benefit from IV isotonic fluid hydration pre and post cath, however this may not be prudent given concerns for precipitating PVC    Monitor labs will follow

## 2024-12-19 LAB
ANION GAP SERPL CALC-SCNC: 14 MMOL/L — SIGNIFICANT CHANGE UP (ref 5–17)
BUN SERPL-MCNC: 48.5 MG/DL — HIGH (ref 8–20)
CALCIUM SERPL-MCNC: 8.9 MG/DL — SIGNIFICANT CHANGE UP (ref 8.4–10.5)
CHLORIDE SERPL-SCNC: 104 MMOL/L — SIGNIFICANT CHANGE UP (ref 96–108)
CO2 SERPL-SCNC: 21 MMOL/L — LOW (ref 22–29)
CREAT SERPL-MCNC: 3.13 MG/DL — HIGH (ref 0.5–1.3)
EGFR: 15 ML/MIN/1.73M2 — LOW
GLUCOSE BLDC GLUCOMTR-MCNC: 121 MG/DL — HIGH (ref 70–99)
GLUCOSE BLDC GLUCOMTR-MCNC: 143 MG/DL — HIGH (ref 70–99)
GLUCOSE BLDC GLUCOMTR-MCNC: 177 MG/DL — HIGH (ref 70–99)
GLUCOSE BLDC GLUCOMTR-MCNC: 193 MG/DL — HIGH (ref 70–99)
GLUCOSE SERPL-MCNC: 103 MG/DL — HIGH (ref 70–99)
HCT VFR BLD CALC: 29.8 % — LOW (ref 34.5–45)
HGB BLD-MCNC: 9.5 G/DL — LOW (ref 11.5–15.5)
MAGNESIUM SERPL-MCNC: 2.2 MG/DL — SIGNIFICANT CHANGE UP (ref 1.8–2.6)
MCHC RBC-ENTMCNC: 29.2 PG — SIGNIFICANT CHANGE UP (ref 27–34)
MCHC RBC-ENTMCNC: 31.9 G/DL — LOW (ref 32–36)
MCV RBC AUTO: 91.7 FL — SIGNIFICANT CHANGE UP (ref 80–100)
PHOSPHATE SERPL-MCNC: 4.5 MG/DL — SIGNIFICANT CHANGE UP (ref 2.4–4.7)
PLATELET # BLD AUTO: 330 K/UL — SIGNIFICANT CHANGE UP (ref 150–400)
POTASSIUM SERPL-MCNC: 4.9 MMOL/L — SIGNIFICANT CHANGE UP (ref 3.5–5.3)
POTASSIUM SERPL-SCNC: 4.9 MMOL/L — SIGNIFICANT CHANGE UP (ref 3.5–5.3)
RAPID RVP RESULT: SIGNIFICANT CHANGE UP
RBC # BLD: 3.25 M/UL — LOW (ref 3.8–5.2)
RBC # FLD: 12.9 % — SIGNIFICANT CHANGE UP (ref 10.3–14.5)
SARS-COV-2 RNA SPEC QL NAA+PROBE: SIGNIFICANT CHANGE UP
SODIUM SERPL-SCNC: 139 MMOL/L — SIGNIFICANT CHANGE UP (ref 135–145)
WBC # BLD: 5 K/UL — SIGNIFICANT CHANGE UP (ref 3.8–10.5)
WBC # FLD AUTO: 5 K/UL — SIGNIFICANT CHANGE UP (ref 3.8–10.5)

## 2024-12-19 PROCEDURE — 99233 SBSQ HOSP IP/OBS HIGH 50: CPT

## 2024-12-19 PROCEDURE — 99232 SBSQ HOSP IP/OBS MODERATE 35: CPT

## 2024-12-19 PROCEDURE — 70450 CT HEAD/BRAIN W/O DYE: CPT | Mod: 26

## 2024-12-19 RX ORDER — MECLIZINE HCL 12.5 MG
25 TABLET ORAL THREE TIMES A DAY
Refills: 0 | Status: DISCONTINUED | OUTPATIENT
Start: 2024-12-19 | End: 2024-12-20

## 2024-12-19 RX ORDER — SODIUM BICARBONATE 84 MG/ML
650 INJECTION, SOLUTION INTRAVENOUS THREE TIMES A DAY
Refills: 0 | Status: DISCONTINUED | OUTPATIENT
Start: 2024-12-19 | End: 2024-12-20

## 2024-12-19 RX ORDER — 0.9 % SODIUM CHLORIDE 0.9 %
1000 INTRAVENOUS SOLUTION INTRAVENOUS
Refills: 0 | Status: COMPLETED | OUTPATIENT
Start: 2024-12-19 | End: 2024-12-19

## 2024-12-19 RX ORDER — CARBAMIDE PEROXIDE 65 MG/ML
1 SOLUTION/ DROPS AURICULAR (OTIC)
Refills: 0 | Status: DISCONTINUED | OUTPATIENT
Start: 2024-12-19 | End: 2024-12-20

## 2024-12-19 RX ADMIN — Medication 40 MILLIGRAM(S): at 22:18

## 2024-12-19 RX ADMIN — HYDRALAZINE HYDROCHLORIDE 50 MILLIGRAM(S): 10 TABLET ORAL at 22:18

## 2024-12-19 RX ADMIN — SODIUM BICARBONATE 650 MILLIGRAM(S): 84 INJECTION, SOLUTION INTRAVENOUS at 22:19

## 2024-12-19 RX ADMIN — Medication 1: at 12:14

## 2024-12-19 RX ADMIN — Medication 60 MILLIGRAM(S): at 12:14

## 2024-12-19 RX ADMIN — Medication 25 MILLIGRAM(S): at 12:14

## 2024-12-19 RX ADMIN — Medication 100 MILLILITER(S): at 22:19

## 2024-12-19 RX ADMIN — METOPROLOL TARTRATE 100 MILLIGRAM(S): 100 TABLET, FILM COATED ORAL at 05:03

## 2024-12-19 RX ADMIN — Medication 30 MILLILITER(S): at 18:49

## 2024-12-19 RX ADMIN — HYDRALAZINE HYDROCHLORIDE 50 MILLIGRAM(S): 10 TABLET ORAL at 13:04

## 2024-12-19 RX ADMIN — APIXABAN 5 MILLIGRAM(S): 2.5 TABLET, FILM COATED ORAL at 05:04

## 2024-12-19 RX ADMIN — CARBAMIDE PEROXIDE 1 DROP(S): 65 SOLUTION/ DROPS AURICULAR (OTIC) at 17:10

## 2024-12-19 RX ADMIN — ONDANSETRON HYDROCHLORIDE 4 MILLIGRAM(S): 4 TABLET, FILM COATED ORAL at 18:10

## 2024-12-19 RX ADMIN — HYDRALAZINE HYDROCHLORIDE 50 MILLIGRAM(S): 10 TABLET ORAL at 05:03

## 2024-12-19 RX ADMIN — Medication 100 MILLILITER(S): at 12:13

## 2024-12-19 RX ADMIN — APIXABAN 5 MILLIGRAM(S): 2.5 TABLET, FILM COATED ORAL at 17:09

## 2024-12-19 RX ADMIN — FUROSEMIDE 40 MILLIGRAM(S): 40 TABLET ORAL at 05:04

## 2024-12-19 NOTE — PROGRESS NOTE ADULT - ASSESSMENT
Patient is a 73 year old female who is being managed as an inpatient for dyspnea. She has a PMH of HFpEF, PHTN, afib on AC, HTN, HLD, DM2, CKD, thyroidectomy. Cardiology was consulted and are planning for a cadiac cath today. Nephrology also has been following due to CKD and planned cath.    Dizziness likely secondary to BPPV  - Positive [place]-Hallpike maneuver  - Started on meclizine 25mg TID PRN and Debrox BID  - Follow up CT head  - Follow up RVP  - Will consider work up based on symptoms    Dyspnea, likely secondary to HFpEF and PHTN  Rule out coronary artery disease  - Troponin negative x2  - Continue all cardiac medications   - S/p cardiac cath, no stenting done  - TTE done on 12/17 showing EF 70-75%  - Follow up Cardiology recommendations    Atrial fibrillation  - Continue Toprol XL 100mg daily  - Continue Eliquis    CKD, stage IV  Anemia of chronic disease  - Patient s/p cardiac cath, creatinine slightly increasing  - Monitor for worsening renal function given increased risk of JORGE A  - Follow up Nephrology recommendations    DM  - Hold glipizide  - Hemoglobin A1c 5.9  - Continue sliding scale insulin for now    HTN  - Continue hydralazine 50mg TID, Imdur 60mg daily, Toprol XL 100mg daily    HLD  - Continue atorvastatin 40mg HS    Thyroid nodule s/p thyroidectomy  - Does not take any medications for past two years  - Regularly follows up as an outpatient      DVT/GI ppx: Eliquis  Diet: DASH  Code Status: Full code  Dispo: Home in 1-2 days

## 2024-12-19 NOTE — PROGRESS NOTE ADULT - SUBJECTIVE AND OBJECTIVE BOX
[facility] Division of Hospital Medicine  Jazmin Chen, DO  I'm reachable on [employer] Teams    Patient is a 73y old  Female who presents with a chief complaint of dyspnea (18 Dec 2024 09:55)      SUBJECTIVE / OVERNIGHT EVENTS:  Patient was seen and examined at bedside today. She is in no acute distress. She denies any chest pain at this time along with any shortness of breath, abdominal pain, or any other acute symptoms. Patient is endorsing dizziness with positional changes, especially when she is moving from laying down to standing up.    MEDICATIONS  (STANDING):  albuterol    0.083% 2.5 milliGRAM(s) Nebulizer every 6 hours  atorvastatin 40 milliGRAM(s) Oral at bedtime  dextrose 5%. 1000 milliLiter(s) (100 mL/Hr) IV Continuous <Continuous>  dextrose 5%. 1000 milliLiter(s) (50 mL/Hr) IV Continuous <Continuous>  dextrose 50% Injectable 25 Gram(s) IV Push once  dextrose 50% Injectable 12.5 Gram(s) IV Push once  dextrose 50% Injectable 25 Gram(s) IV Push once  ergocalciferol 52236 Unit(s) Oral <User Schedule>  furosemide    Tablet 40 milliGRAM(s) Oral daily  glucagon  Injectable 1 milliGRAM(s) IntraMuscular once  heparin  Infusion.  Unit(s)/Hr (17 mL/Hr) IV Continuous <Continuous>  hydrALAZINE 50 milliGRAM(s) Oral three times a day  insulin lispro (ADMELOG) corrective regimen sliding scale   SubCutaneous three times a day before meals  isosorbide   mononitrate ER Tablet (IMDUR) 60 milliGRAM(s) Oral daily  metoprolol succinate  milliGRAM(s) Oral daily    MEDICATIONS  (PRN):  acetaminophen     Tablet .. 650 milliGRAM(s) Oral every 6 hours PRN Temp greater or equal to 38C (100.4F), Mild Pain (1 - 3)  aluminum hydroxide/magnesium hydroxide/simethicone Suspension 30 milliLiter(s) Oral every 4 hours PRN Dyspepsia  dextrose Oral Gel 15 Gram(s) Oral once PRN Blood Glucose LESS THAN 70 milliGRAM(s)/deciliter  heparin   Injectable 7500 Unit(s) IV Push every 6 hours PRN For aPTT less than 40  heparin   Injectable 3500 Unit(s) IV Push every 6 hours PRN For aPTT between 40 - 57  melatonin 3 milliGRAM(s) Oral at bedtime PRN Insomnia  ondansetron Injectable 4 milliGRAM(s) IV Push every 8 hours PRN Nausea and/or Vomiting    CAPILLARY BLOOD GLUCOSE      POCT Blood Glucose.: 109 mg/dL (18 Dec 2024 08:18)  POCT Blood Glucose.: 193 mg/dL (17 Dec 2024 16:38)    I&O's Summary      PHYSICAL EXAM:  Vital Signs Last 24 Hrs  T(C): 36.7 (18 Dec 2024 08:46), Max: 36.7 (18 Dec 2024 08:46)  T(F): 98 (18 Dec 2024 08:46), Max: 98 (18 Dec 2024 08:46)  HR: 65 (18 Dec 2024 08:46) (65 - 89)  BP: 109/65 (18 Dec 2024 08:46) (109/65 - 180/74)  BP(mean): 97 (17 Dec 2024 22:06) (97 - 97)  RR: 19 (18 Dec 2024 08:46) (18 - 20)  SpO2: 97% (18 Dec 2024 08:46) (97% - 100%)    Parameters below as of 18 Dec 2024 08:46  Patient On (Oxygen Delivery Method): room air        CONSTITUTIONAL: NAD, well-developed, well-groomed  EYES:  EOMI, conjunctiva and sclera clear  ENMT: Moist oral mucosa  NECK: Supple, no JVD  RESPIRATORY: Normal respiratory effort; lungs are clear to auscultation bilaterally  CARDIOVASCULAR: Regular rate and rhythm, normal S1 and S2, no murmur/rub/gallop; No lower extremity edema  ABDOMEN: normoactive bowel sounds, soft, nontender to palpation, no distension   MUSCULOSKELETAL:  no clubbing or cyanosis of digits; no joint swelling or tenderness to palpation  PSYCH: A+O x3; affect appropriate, calm and cooperative  NEUROLOGY: CN 2-12 are intact and symmetric; no gross sensory deficits, positive [place] Hallpike    LABS:                        9.1    5.40  )-----------( 337      ( 18 Dec 2024 05:51 )             27.7     12-18    141  |  104  |  47.8[H]  ----------------------------<  95  4.5   |  23.0  |  2.77[H]    Ca    9.0      18 Dec 2024 05:51    TPro  7.2  /  Alb  4.3  /  TBili  0.4  /  DBili  x   /  AST  25  /  ALT  17  /  AlkPhos  65  12-17    PT/INR - ( 17 Dec 2024 11:53 )   PT: 22.1 sec;   INR: 1.92 ratio         PTT - ( 18 Dec 2024 07:58 )  PTT:>200.0 sec      Urinalysis Basic - ( 18 Dec 2024 05:51 )    Color: x / Appearance: x / SG: x / pH: x  Gluc: 95 mg/dL / Ketone: x  / Bili: x / Urobili: x   Blood: x / Protein: x / Nitrite: x   Leuk Esterase: x / RBC: x / WBC x   Sq Epi: x / Non Sq Epi: x / Bacteria: x

## 2024-12-19 NOTE — PROGRESS NOTE ADULT - SUBJECTIVE AND OBJECTIVE BOX
DEVANTE RODRIGUEZ  [id number]      Chief Complaint:    follow up CP/SOB    Subjective:   Patient with some dizziness when holding head down.  No other c/o.  Denies CP, SOB, palps.    24 hour Tele:   No events          acetaminophen     Tablet .. 650 milliGRAM(s) Oral every 6 hours PRN  aluminum hydroxide/magnesium hydroxide/simethicone Suspension 30 milliLiter(s) Oral every 4 hours PRN  apixaban 5 milliGRAM(s) Oral two times a day  atorvastatin 40 milliGRAM(s) Oral at bedtime  carbamide peroxide Otic Solution 1 Drop(s) Left Ear two times a day  dextrose 5%. 1000 milliLiter(s) IV Continuous <Continuous>  dextrose 5%. 1000 milliLiter(s) IV Continuous <Continuous>  dextrose 50% Injectable 25 Gram(s) IV Push once  dextrose 50% Injectable 12.5 Gram(s) IV Push once  dextrose 50% Injectable 25 Gram(s) IV Push once  dextrose Oral Gel 15 Gram(s) Oral once PRN  ergocalciferol 64365 Unit(s) Oral <User Schedule>  glucagon  Injectable 1 milliGRAM(s) IntraMuscular once  hydrALAZINE 50 milliGRAM(s) Oral three times a day  insulin lispro (ADMELOG) corrective regimen sliding scale   SubCutaneous three times a day before meals  isosorbide   mononitrate ER Tablet (IMDUR) 60 milliGRAM(s) Oral daily  lactated ringers. 1000 milliLiter(s) IV Continuous <Continuous>  meclizine 25 milliGRAM(s) Oral three times a day PRN  melatonin 3 milliGRAM(s) Oral at bedtime PRN  metoprolol succinate  milliGRAM(s) Oral daily  ondansetron Injectable 4 milliGRAM(s) IV Push every 8 hours PRN  sodium chloride 0.9%. 1000 milliLiter(s) IV Continuous <Continuous>          Physical Exam:  T(C): 36.7 (12-19-24 @ 08:46), Max: 36.7 (12-19-24 @ 08:46)  HR: 72 (12-19-24 @ 08:46) (67 - 81)  BP: 143/60 (12-19-24 @ 08:46) (124/64 - 164/85)  RR: 18 (12-19-24 @ 05:00) (12 - 20)  SpO2: 96% (12-19-24 @ 08:46) (94% - 100%)  General: Comfortable in NAD  HEENT: MMM, sclera anicteric  Resp: CTA bilaterally  CVS: nl s1s2, rrr, no s3/JVD  Abd: soft, NT, ND, obese  Ext: No c/c/e  Neuro A&O x3  Psych: Normal affect        Labs:   19 Dec 2024 05:21    139    |  104    |  48.5   ----------------------------<  103    4.9     |  21.0   |  3.13     Ca    8.9        19 Dec 2024 05:21  Phos  4.5       19 Dec 2024 05:21  Mg     2.2       19 Dec 2024 05:21                            9.5    5.00  )-----------( 330      ( 19 Dec 2024 05:21 )             29.8     PTT - ( 18 Dec 2024 07:58 )  PTT:>200.0 sec          ECHO gsh: 12/2024  Dilated RA, RV and moderate TR with PA pressure 60 mm Hg. Mild MR and normal LV systolic fx.     ECHO 12/2024:   1. Left ventricular systolic function is normal with an ejection fraction of 73 % by Trevino's method of disks with an ejection fraction visually estimated at 70 to 75 %.   2. The left ventricular diastolic function is indeterminate.   3. Normal right ventricular cavity size and normal right ventricular systolic function.   4. Estimated pulmonary artery systolic pressure is 56 mmHg, consistent with moderate pulmonary hypertension.   5. Left atrium is normal in size.   6. Mild to moderate tricuspid regurgitation.   7. There is mild calcification of the mitral valve annulus.   8. Fibrocalcific aortic valve sclerosis without stenosis.   9. Aortic calcification seen in the aortic root.  10. No pericardial effusion seen.      S/P R/LHC via RRA/RBV on 12/18/24 which revealed 40% stenosis of mLAD, and right pressures of:     RA: 7     RV: 41/2     PA: 44/12 (23)     PCWP: 10     CO: 14.92 LPM     CI: 7.48 LPM/m2      Assessment:  73 y.o. female with hx of PAF on AC,  diabetes, hypertension, hyperlipidemia, recent admission at [facility] with dyspnea and leg edema, she was found to have elevated PASP on echo, underwent testing and her pulmonary hypertension was deemed being related to diastolic dysfunction and CHINO. Patient was eventually dc home however, gradually she began having increasing CHINO and chest tightness. This morning, patient woke up with a 'sinking' feeling in her chest, chest tightness and SOB. Cardiology consultation requested for evaluation of dyspnea  -Appears euvolemic now  -dyspnea of exertion maybe related to PHTN, CHINO and HFpEF.  however obstructive CAD also a possibility given her increased ASCVD risk. LHC/RHC will help elucidate symptoms and etiology of PHTN   -Repeat echo here with lower PAP, normal BiV function and no significant valve disease  -Echo with nonobstructive CAD, mild PHTN, normal filling pressures.  Some dizziness may be 2/2 being dry.    Plan    1. No further CV testing.  2. Eliquis restarted.  3. No diuretics now.  Can liberalize fluid intake some.  4. Continue metoprolol, hydralazine and nitrates.   5. Hold on SGLT2i and MRA given Cr.  6. No objection to d/c from CV perspective with close OP f/u.  Needs close renal f/u but got minimal contrast.

## 2024-12-19 NOTE — PROGRESS NOTE ADULT - ASSESSMENT
74 yo Female PMH + CKD due to DM and HTN who presents to the ED with CP and progressive SOB.     CKD suspect stage III +DM, HTN   Serum Cr 2.7--> 3.1  Looking back at [facility] labs cr has been approx 1.2- 1.3 in past  Pt also has h/o uncontrolled DM w HgA1C of 9 one yr ago  Admitted with CP/SOB, known CAD  (?) CHF component  s/p cardiac cath 12/18--> Mild to moderate CAD  - will hold Lasix 40 mg po Q day   - on short coarse of IVF w LR  - pt  aware of elevated risk for JORGE A/ ATN and worsening renal function post procedure, rise in Cr possible JORGE A    Monitor labs will follow

## 2024-12-19 NOTE — PROGRESS NOTE ADULT - SUBJECTIVE AND OBJECTIVE BOX
NEPHROLOGY INTERVAL HPI/OVERNIGHT EVENTS:    Examined earlier  No distress  Feels ok  denies HA CP no SOB  Family visiting      MEDICATIONS  (STANDING):  apixaban 5 milliGRAM(s) Oral two times a day  atorvastatin 40 milliGRAM(s) Oral at bedtime  carbamide peroxide Otic Solution 1 Drop(s) Left Ear two times a day  dextrose 5%. 1000 milliLiter(s) (100 mL/Hr) IV Continuous <Continuous>  dextrose 5%. 1000 milliLiter(s) (50 mL/Hr) IV Continuous <Continuous>  dextrose 50% Injectable 25 Gram(s) IV Push once  dextrose 50% Injectable 12.5 Gram(s) IV Push once  dextrose 50% Injectable 25 Gram(s) IV Push once  ergocalciferol 33960 Unit(s) Oral <User Schedule>  glucagon  Injectable 1 milliGRAM(s) IntraMuscular once  hydrALAZINE 50 milliGRAM(s) Oral three times a day  insulin lispro (ADMELOG) corrective regimen sliding scale   SubCutaneous three times a day before meals  isosorbide   mononitrate ER Tablet (IMDUR) 60 milliGRAM(s) Oral daily  lactated ringers. 1000 milliLiter(s) (100 mL/Hr) IV Continuous <Continuous>  metoprolol succinate  milliGRAM(s) Oral daily  sodium chloride 0.9%. 1000 milliLiter(s) (50 mL/Hr) IV Continuous <Continuous>    MEDICATIONS  (PRN):  acetaminophen     Tablet .. 650 milliGRAM(s) Oral every 6 hours PRN Temp greater or equal to 38C (100.4F), Mild Pain (1 - 3)  aluminum hydroxide/magnesium hydroxide/simethicone Suspension 30 milliLiter(s) Oral every 4 hours PRN Dyspepsia  dextrose Oral Gel 15 Gram(s) Oral once PRN Blood Glucose LESS THAN 70 milliGRAM(s)/deciliter  meclizine 25 milliGRAM(s) Oral three times a day PRN Dizziness  melatonin 3 milliGRAM(s) Oral at bedtime PRN Insomnia  ondansetron Injectable 4 milliGRAM(s) IV Push every 8 hours PRN Nausea and/or Vomiting      Allergies    No Known Allergies    Intolerances        Vital Signs Last 24 Hrs  T(C): 36.7 (19 Dec 2024 12:27), Max: 36.7 (19 Dec 2024 08:46)  T(F): 98 (19 Dec 2024 12:27), Max: 98 (19 Dec 2024 08:46)  HR: 71 (19 Dec 2024 12:) (67 - 81)  BP: 163/83 (19 Dec 2024 12:27) (124/64 - 164/85)  BP(mean): 111 (19 Dec 2024 05:00) (95 - 111)  RR: 18 (19 Dec 2024 05:00) (12 - 20)  SpO2: 98% (19 Dec 2024 12:27) (94% - 100%)    Parameters below as of 19 Dec 2024 12:27  Patient On (Oxygen Delivery Method): room air      Daily Height in cm: 165.1 (18 Dec 2024 15:47)    Daily Weight in k.1 (19 Dec 2024 05:00)    PHYSICAL EXAM:  GENERAL: fatigued, NAD  HEAD: NCAT  EYES: EOMI  ENMT: Moist MMs  NECK: Supple, + JVD  NERVOUS SYSTEM:  Alert & Oriented X3  CHEST/LUNG: Diminished BS with crackles  HEART: Regular rate and rhythm; No rub  ABDOMEN: Soft, Nontender, +BS  EXTREMITIES: + LE dependent edema    LABS:                        9.5    5.00  )-----------( 330      ( 19 Dec 2024 05:21 )             29.8         139  |  104  |  48.5[H]  ----------------------------<  103[H]  4.9   |  21.0[L]  |  3.13[H]    Ca    8.9      19 Dec 2024 05:21  Phos  4.5       Mg     2.2           PTT - ( 18 Dec 2024 07:58 )  PTT:>200.0 sec  Urinalysis Basic - ( 19 Dec 2024 05:21 )    Color: x / Appearance: x / SG: x / pH: x  Gluc: 103 mg/dL / Ketone: x  / Bili: x / Urobili: x   Blood: x / Protein: x / Nitrite: x   Leuk Esterase: x / RBC: x / WBC x   Sq Epi: x / Non Sq Epi: x / Bacteria: x      Magnesium: 2.2 mg/dL ( @ 05:21)  Phosphorus: 4.5 mg/dL ( @ 05:21)          RADIOLOGY & ADDITIONAL TESTS:

## 2024-12-20 ENCOUNTER — TRANSCRIPTION ENCOUNTER (OUTPATIENT)
Age: 74
End: 2024-12-20

## 2024-12-20 VITALS — HEART RATE: 75 BPM | DIASTOLIC BLOOD PRESSURE: 78 MMHG | SYSTOLIC BLOOD PRESSURE: 134 MMHG

## 2024-12-20 LAB
ANION GAP SERPL CALC-SCNC: 15 MMOL/L — SIGNIFICANT CHANGE UP (ref 5–17)
BUN SERPL-MCNC: 49.1 MG/DL — HIGH (ref 8–20)
CALCIUM SERPL-MCNC: 8.2 MG/DL — LOW (ref 8.4–10.5)
CHLORIDE SERPL-SCNC: 104 MMOL/L — SIGNIFICANT CHANGE UP (ref 96–108)
CO2 SERPL-SCNC: 21 MMOL/L — LOW (ref 22–29)
CREAT SERPL-MCNC: 3 MG/DL — HIGH (ref 0.5–1.3)
EGFR: 16 ML/MIN/1.73M2 — LOW
GLUCOSE BLDC GLUCOMTR-MCNC: 113 MG/DL — HIGH (ref 70–99)
GLUCOSE BLDC GLUCOMTR-MCNC: 128 MG/DL — HIGH (ref 70–99)
GLUCOSE BLDC GLUCOMTR-MCNC: 177 MG/DL — HIGH (ref 70–99)
GLUCOSE SERPL-MCNC: 107 MG/DL — HIGH (ref 70–99)
HCT VFR BLD CALC: 26.4 % — LOW (ref 34.5–45)
HGB BLD-MCNC: 8.5 G/DL — LOW (ref 11.5–15.5)
MCHC RBC-ENTMCNC: 29.3 PG — SIGNIFICANT CHANGE UP (ref 27–34)
MCHC RBC-ENTMCNC: 32.2 G/DL — SIGNIFICANT CHANGE UP (ref 32–36)
MCV RBC AUTO: 91 FL — SIGNIFICANT CHANGE UP (ref 80–100)
PLATELET # BLD AUTO: 268 K/UL — SIGNIFICANT CHANGE UP (ref 150–400)
POTASSIUM SERPL-MCNC: 4.4 MMOL/L — SIGNIFICANT CHANGE UP (ref 3.5–5.3)
POTASSIUM SERPL-SCNC: 4.4 MMOL/L — SIGNIFICANT CHANGE UP (ref 3.5–5.3)
RBC # BLD: 2.9 M/UL — LOW (ref 3.8–5.2)
RBC # FLD: 12.6 % — SIGNIFICANT CHANGE UP (ref 10.3–14.5)
SODIUM SERPL-SCNC: 140 MMOL/L — SIGNIFICANT CHANGE UP (ref 135–145)
WBC # BLD: 4.85 K/UL — SIGNIFICANT CHANGE UP (ref 3.8–10.5)
WBC # FLD AUTO: 4.85 K/UL — SIGNIFICANT CHANGE UP (ref 3.8–10.5)

## 2024-12-20 PROCEDURE — 85730 THROMBOPLASTIN TIME PARTIAL: CPT

## 2024-12-20 PROCEDURE — 81001 URINALYSIS AUTO W/SCOPE: CPT

## 2024-12-20 PROCEDURE — 70450 CT HEAD/BRAIN W/O DYE: CPT | Mod: MC

## 2024-12-20 PROCEDURE — C1769: CPT

## 2024-12-20 PROCEDURE — 93460 R&L HRT ART/VENTRICLE ANGIO: CPT

## 2024-12-20 PROCEDURE — 0225U NFCT DS DNA&RNA 21 SARSCOV2: CPT

## 2024-12-20 PROCEDURE — 93306 TTE W/DOPPLER COMPLETE: CPT

## 2024-12-20 PROCEDURE — 84484 ASSAY OF TROPONIN QUANT: CPT

## 2024-12-20 PROCEDURE — 82962 GLUCOSE BLOOD TEST: CPT

## 2024-12-20 PROCEDURE — 87637 SARSCOV2&INF A&B&RSV AMP PRB: CPT

## 2024-12-20 PROCEDURE — 84443 ASSAY THYROID STIM HORMONE: CPT

## 2024-12-20 PROCEDURE — 94640 AIRWAY INHALATION TREATMENT: CPT

## 2024-12-20 PROCEDURE — C1894: CPT

## 2024-12-20 PROCEDURE — 84439 ASSAY OF FREE THYROXINE: CPT

## 2024-12-20 PROCEDURE — 99232 SBSQ HOSP IP/OBS MODERATE 35: CPT

## 2024-12-20 PROCEDURE — 99285 EMERGENCY DEPT VISIT HI MDM: CPT

## 2024-12-20 PROCEDURE — 85027 COMPLETE CBC AUTOMATED: CPT

## 2024-12-20 PROCEDURE — 36415 COLL VENOUS BLD VENIPUNCTURE: CPT

## 2024-12-20 PROCEDURE — 83036 HEMOGLOBIN GLYCOSYLATED A1C: CPT

## 2024-12-20 PROCEDURE — C1887: CPT

## 2024-12-20 PROCEDURE — 71045 X-RAY EXAM CHEST 1 VIEW: CPT

## 2024-12-20 PROCEDURE — 83880 ASSAY OF NATRIURETIC PEPTIDE: CPT

## 2024-12-20 PROCEDURE — 76775 US EXAM ABDO BACK WALL LIM: CPT | Mod: 26

## 2024-12-20 PROCEDURE — 83735 ASSAY OF MAGNESIUM: CPT

## 2024-12-20 PROCEDURE — 0241U: CPT

## 2024-12-20 PROCEDURE — 80061 LIPID PANEL: CPT

## 2024-12-20 PROCEDURE — 85610 PROTHROMBIN TIME: CPT

## 2024-12-20 PROCEDURE — 93005 ELECTROCARDIOGRAM TRACING: CPT

## 2024-12-20 PROCEDURE — 80048 BASIC METABOLIC PNL TOTAL CA: CPT

## 2024-12-20 PROCEDURE — 85025 COMPLETE CBC W/AUTO DIFF WBC: CPT

## 2024-12-20 PROCEDURE — 84100 ASSAY OF PHOSPHORUS: CPT

## 2024-12-20 PROCEDURE — 80053 COMPREHEN METABOLIC PANEL: CPT

## 2024-12-20 PROCEDURE — 99239 HOSP IP/OBS DSCHRG MGMT >30: CPT

## 2024-12-20 PROCEDURE — 76775 US EXAM ABDO BACK WALL LIM: CPT

## 2024-12-20 RX ORDER — MECLIZINE HCL 12.5 MG
1 TABLET ORAL
Qty: 21 | Refills: 0
Start: 2024-12-20 | End: 2024-12-26

## 2024-12-20 RX ORDER — MECLIZINE HCL 12.5 MG
1 TABLET ORAL
Qty: 0 | Refills: 0 | DISCHARGE
Start: 2024-12-20

## 2024-12-20 RX ORDER — METOPROLOL TARTRATE 100 MG/1
1 TABLET, FILM COATED ORAL
Qty: 0 | Refills: 0 | DISCHARGE
Start: 2024-12-20

## 2024-12-20 RX ORDER — ERYTHROPOIETIN 3000 [IU]/ML
40000 INJECTION, SOLUTION INTRAVENOUS; SUBCUTANEOUS ONCE
Refills: 0 | Status: COMPLETED | OUTPATIENT
Start: 2024-12-20 | End: 2024-12-20

## 2024-12-20 RX ORDER — ISOSORBIDE MONONITRATE 10 MG
1 TABLET ORAL
Qty: 30 | Refills: 0
Start: 2024-12-20 | End: 2025-01-18

## 2024-12-20 RX ORDER — CARBAMIDE PEROXIDE 65 MG/ML
1 SOLUTION/ DROPS AURICULAR (OTIC)
Qty: 1 | Refills: 0
Start: 2024-12-20 | End: 2024-12-24

## 2024-12-20 RX ORDER — SODIUM BICARBONATE 84 MG/ML
1300 INJECTION, SOLUTION INTRAVENOUS
Refills: 0 | Status: DISCONTINUED | OUTPATIENT
Start: 2024-12-20 | End: 2024-12-20

## 2024-12-20 RX ORDER — CARBAMIDE PEROXIDE 65 MG/ML
1 SOLUTION/ DROPS AURICULAR (OTIC)
Qty: 0 | Refills: 0 | DISCHARGE
Start: 2024-12-20

## 2024-12-20 RX ORDER — HYDRALAZINE HYDROCHLORIDE 10 MG/1
1 TABLET ORAL
Qty: 90 | Refills: 0
Start: 2024-12-20 | End: 2025-01-18

## 2024-12-20 RX ORDER — METOPROLOL TARTRATE 100 MG/1
1 TABLET, FILM COATED ORAL
Qty: 30 | Refills: 0
Start: 2024-12-20 | End: 2025-01-18

## 2024-12-20 RX ORDER — APIXABAN 2.5 MG/1
1 TABLET, FILM COATED ORAL
Qty: 60 | Refills: 0
Start: 2024-12-20 | End: 2025-01-18

## 2024-12-20 RX ADMIN — Medication 40 MILLIGRAM(S): at 21:04

## 2024-12-20 RX ADMIN — APIXABAN 5 MILLIGRAM(S): 2.5 TABLET, FILM COATED ORAL at 17:09

## 2024-12-20 RX ADMIN — SODIUM BICARBONATE 650 MILLIGRAM(S): 84 INJECTION, SOLUTION INTRAVENOUS at 06:01

## 2024-12-20 RX ADMIN — CARBAMIDE PEROXIDE 1 DROP(S): 65 SOLUTION/ DROPS AURICULAR (OTIC) at 17:10

## 2024-12-20 RX ADMIN — HYDRALAZINE HYDROCHLORIDE 50 MILLIGRAM(S): 10 TABLET ORAL at 12:40

## 2024-12-20 RX ADMIN — Medication 60 MILLIGRAM(S): at 12:41

## 2024-12-20 RX ADMIN — METOPROLOL TARTRATE 100 MILLIGRAM(S): 100 TABLET, FILM COATED ORAL at 06:01

## 2024-12-20 RX ADMIN — Medication 50000 UNIT(S): at 06:01

## 2024-12-20 RX ADMIN — APIXABAN 5 MILLIGRAM(S): 2.5 TABLET, FILM COATED ORAL at 06:01

## 2024-12-20 RX ADMIN — ERYTHROPOIETIN 40000 UNIT(S): 3000 INJECTION, SOLUTION INTRAVENOUS; SUBCUTANEOUS at 12:41

## 2024-12-20 RX ADMIN — HYDRALAZINE HYDROCHLORIDE 50 MILLIGRAM(S): 10 TABLET ORAL at 21:04

## 2024-12-20 RX ADMIN — HYDRALAZINE HYDROCHLORIDE 50 MILLIGRAM(S): 10 TABLET ORAL at 06:01

## 2024-12-20 RX ADMIN — Medication 1: at 12:41

## 2024-12-20 RX ADMIN — SODIUM BICARBONATE 1300 MILLIGRAM(S): 84 INJECTION, SOLUTION INTRAVENOUS at 17:09

## 2024-12-20 RX ADMIN — CARBAMIDE PEROXIDE 1 DROP(S): 65 SOLUTION/ DROPS AURICULAR (OTIC) at 06:00

## 2024-12-20 RX ADMIN — Medication 30 MILLILITER(S): at 00:20

## 2024-12-20 NOTE — DISCHARGE NOTE NURSING/CASE MANAGEMENT/SOCIAL WORK - FINANCIAL ASSISTANCE
[facility] provides services at a reduced cost to those who are determined to be eligible through [facility]’s financial assistance program. Information regarding [facility]’s financial assistance program can be found by going to [email or web address] or by calling [phone].

## 2024-12-20 NOTE — PROGRESS NOTE ADULT - SUBJECTIVE AND OBJECTIVE BOX
Kaylah RODRIGUEZ  [id number]      Chief Complaint:    follow up CP/SOB    Subjective:   Patient with no c/o.  Denies CP, SOB, palps.    24 hour Tele:   No events        acetaminophen     Tablet .. 650 milliGRAM(s) Oral every 6 hours PRN  aluminum hydroxide/magnesium hydroxide/simethicone Suspension 30 milliLiter(s) Oral every 4 hours PRN  apixaban 5 milliGRAM(s) Oral two times a day  atorvastatin 40 milliGRAM(s) Oral at bedtime  carbamide peroxide Otic Solution 1 Drop(s) Left Ear two times a day  dextrose 5%. 1000 milliLiter(s) IV Continuous <Continuous>  dextrose 5%. 1000 milliLiter(s) IV Continuous <Continuous>  dextrose 50% Injectable 25 Gram(s) IV Push once  dextrose 50% Injectable 12.5 Gram(s) IV Push once  dextrose 50% Injectable 25 Gram(s) IV Push once  dextrose Oral Gel 15 Gram(s) Oral once PRN  epoetin burt-epbx (RETACRIT) Injectable 80486 Unit(s) SubCutaneous once  ergocalciferol 15382 Unit(s) Oral <User Schedule>  glucagon  Injectable 1 milliGRAM(s) IntraMuscular once  hydrALAZINE 50 milliGRAM(s) Oral three times a day  insulin lispro (ADMELOG) corrective regimen sliding scale   SubCutaneous three times a day before meals  isosorbide   mononitrate ER Tablet (IMDUR) 60 milliGRAM(s) Oral daily  meclizine 25 milliGRAM(s) Oral three times a day PRN  melatonin 3 milliGRAM(s) Oral at bedtime PRN  metoprolol succinate  milliGRAM(s) Oral daily  ondansetron Injectable 4 milliGRAM(s) IV Push every 8 hours PRN  sodium bicarbonate 1300 milliGRAM(s) Oral two times a day  sodium chloride 0.9%. 1000 milliLiter(s) IV Continuous <Continuous>          Physical Exam:  T(C): 36.8 (12-20-24 @ 08:25), Max: 36.8 (12-20-24 @ 08:25)  HR: 66 (12-20-24 @ 08:25) (66 - 95)  BP: 124/73 (12-20-24 @ 08:25) (124/73 - 163/83)  RR: 18 (12-20-24 @ 08:25) (18 - 18)  SpO2: 95% (12-20-24 @ 08:25) (95% - 98%)  General: Comfortable in NAD  HEENT: MMM, sclera anicteric  Resp: CTA bilaterally  CVS: nl s1s2, rrr, no s3/JVD  Abd: soft, NT, ND, obese  Ext: No c/c/e  Neuro A&O x3  Psych: Normal affect      Labs:   20 Dec 2024 05:06    140    |  104    |  49.1   ----------------------------<  107    4.4     |  21.0   |  3.00     Ca    8.2        20 Dec 2024 05:06  Phos  4.5       19 Dec 2024 05:21  Mg     2.2       19 Dec 2024 05:21                            8.5    4.85  )-----------( 268      ( 20 Dec 2024 05:06 )             26.4             ECHO gsh: 12/2024  Dilated RA, RV and moderate TR with PA pressure 60 mm Hg. Mild MR and normal LV systolic fx.     ECHO 12/2024:   1. Left ventricular systolic function is normal with an ejection fraction of 73 % by Trevino's method of disks with an ejection fraction visually estimated at 70 to 75 %.   2. The left ventricular diastolic function is indeterminate.   3. Normal right ventricular cavity size and normal right ventricular systolic function.   4. Estimated pulmonary artery systolic pressure is 56 mmHg, consistent with moderate pulmonary hypertension.   5. Left atrium is normal in size.   6. Mild to moderate tricuspid regurgitation.   7. There is mild calcification of the mitral valve annulus.   8. Fibrocalcific aortic valve sclerosis without stenosis.   9. Aortic calcification seen in the aortic root.  10. No pericardial effusion seen.      S/P R/LHC via RRA/RBV on 12/18/24 which revealed 40% stenosis of mLAD, and right pressures of:     RA: 7     RV: 41/2     PA: 44/12 (23)     PCWP: 10     CO: 14.92 LPM     CI: 7.48 LPM/m2      Assessment:  73 y.o. female with hx of PAF on AC,  diabetes, hypertension, hyperlipidemia, recent admission at [facility] with dyspnea and leg edema, she was found to have elevated PASP on echo, underwent testing and her pulmonary hypertension was deemed being related to diastolic dysfunction and CHINO. Patient was eventually dc home however, gradually she began having increasing CHINO and chest tightness. This morning, patient woke up with a 'sinking' feeling in her chest, chest tightness and SOB. Cardiology consultation requested for evaluation of dyspnea  -Appears euvolemic now  -dyspnea of exertion maybe related to PHTN, CHINO and HFpEF.  however obstructive CAD also a possibility given her increased ASCVD risk. LHC/RHC will help elucidate symptoms and etiology of PHTN   -Repeat echo here with lower PAP, normal BiV function and no significant valve disease  -Echo with nonobstructive CAD, mild PHTN, normal filling pressures.  Some dizziness may be 2/2 being dry.  -Cr a little better today, still some risk of JORGE A but low given minimal contrast used.    Plan    1. No further CV testing.  2. Eliquis restarted.  3. No diuretics now.  Can liberalize fluid intake some.  Reconsider maintenance diuretic as OP based on renal fxn.  4. Continue metoprolol, hydralazine and nitrates.   5. Hold on SGLT2i and MRA given Cr.  6. No objection to d/c from CV perspective with close OP f/u.  Needs close renal f/u but got minimal contrast.  7. OP f/u 1-2 weeks post d/c.    Will sign off.  Please call with questions.  Thanks!

## 2024-12-20 NOTE — DISCHARGE NOTE NURSING/CASE MANAGEMENT/SOCIAL WORK - PATIENT PORTAL LINK FT
You can access the FollowMyHealth Patient Portal offered by [facility] by registering at the following website: [email or web address]. By joining [email or web address]’s FollowMyHealth portal, you will also be able to view your health information using other applications (apps) compatible with our system.

## 2024-12-20 NOTE — CHART NOTE - NSCHARTNOTEFT_GEN_A_CORE
Setting as a callback and continue, so I have time to coordinate the patients appointments. I will follow up with the patient once the appointments have been secured. Nephro, Cardio and PCP
Interventional Cardiology Nurse Practitioner Note  Site check    S/P R/LHC via RRA/RBV on 12/18/24 which revealed 40% stenosis of mLAD, and right pressures of:     RA: 7     RV: 41/2     PA: 44/12 (23)     PCWP: 10     CO: 14.92 LPM     CI: 7.48 LPM/m2    Currently in NAD/HDS. RRA/RBV access site benign.  No bleeding/hematoma/ecchymosis.  + distal pulse 2  -site precautions reviewed with patient      ICU Vital Signs Last 24 Hrs  T(C): 36.7 (19 Dec 2024 08:46), Max: 36.7 (19 Dec 2024 08:46)  T(F): 98 (19 Dec 2024 08:46), Max: 98 (19 Dec 2024 08:46)  HR: 72 (19 Dec 2024 08:46) (67 - 81)  BP: 143/60 (19 Dec 2024 08:46) (124/64 - 164/85)  BP(mean): 111 (19 Dec 2024 05:00) (95 - 111)  ABP: --  ABP(mean): --  RR: 18 (19 Dec 2024 05:00) (12 - 20)  SpO2: 96% (19 Dec 2024 08:46) (94% - 100%)    O2 Parameters below as of 19 Dec 2024 08:46  Patient On (Oxygen Delivery Method): room air      LABS:                        9.5    5.00  )-----------( 330      ( 19 Dec 2024 05:21 )             29.8     12-19    139  |  104  |  48.5[H]  ----------------------------<  103[H]  4.9   |  21.0[L]  |  3.13[H]    Ca    8.9      19 Dec 2024 05:21  Phos  4.5     12-19  Mg     2.2     12-19    PTT - ( 18 Dec 2024 07:58 )  PTT:>200.0 sec

## 2024-12-20 NOTE — PROGRESS NOTE ADULT - ASSESSMENT
A) DM 2, CRF, Anemia, Mild pulmonary  hypertension.    P) Renal  sono; iron  studies; Epogen today; if  discharged  to be  followed up as op.

## 2024-12-20 NOTE — DISCHARGE NOTE NURSING/CASE MANAGEMENT/SOCIAL WORK - NSDCFUADDAPPT_GEN_ALL_CORE_FT
APPTS ARE READY TO BE MADE: [X] YES    Best Family or Patient Contact (if needed):    Additional Information about above appointments (if needed):    1: Follow up with Nephrology in 1 week (Dr. Amaya; [facility])  2: Follow up with Cardiology in 1 week ([facility], Dr. Galvan)  3: Follow up with PCP to obtain Pulmonology referral if desired by patient    Other comments or requests:

## 2024-12-20 NOTE — PROGRESS NOTE ADULT - PROVIDER SPECIALTY LIST ADULT
Cardiology
Cardiology
Hospitalist
Nephrology
Cardiology
Intervent Cardiology
Hospitalist
Nephrology
Nephrology

## 2024-12-24 PROBLEM — I50.32 CHRONIC DIASTOLIC (CONGESTIVE) HEART FAILURE: Chronic | Status: ACTIVE | Noted: 2024-12-17

## 2024-12-24 PROBLEM — E04.1 NONTOXIC SINGLE THYROID NODULE: Chronic | Status: ACTIVE | Noted: 2024-12-18

## 2024-12-30 ENCOUNTER — TRANSCRIPTION ENCOUNTER (OUTPATIENT)
Age: 74
End: 2024-12-30

## 2025-01-02 ENCOUNTER — APPOINTMENT (OUTPATIENT)
Dept: CARE COORDINATION | Facility: HOME HEALTH | Age: 75
End: 2025-01-02
Payer: MEDICARE

## 2025-01-02 VITALS
RESPIRATION RATE: 17 BRPM | OXYGEN SATURATION: 99 % | DIASTOLIC BLOOD PRESSURE: 62 MMHG | SYSTOLIC BLOOD PRESSURE: 128 MMHG | HEART RATE: 75 BPM

## 2025-01-02 DIAGNOSIS — K21.9 GASTRO-ESOPHAGEAL REFLUX DISEASE W/OUT ESOPHAGITIS: ICD-10-CM

## 2025-01-02 DIAGNOSIS — I48.91 UNSPECIFIED ATRIAL FIBRILLATION: ICD-10-CM

## 2025-01-02 DIAGNOSIS — E11.22 TYPE 2 DIABETES MELLITUS WITH DIABETIC CHRONIC KIDNEY DISEASE: ICD-10-CM

## 2025-01-02 DIAGNOSIS — I50.9 HEART FAILURE, UNSPECIFIED: ICD-10-CM

## 2025-01-02 PROCEDURE — 99349 HOME/RES VST EST MOD MDM 40: CPT

## 2025-01-02 RX ORDER — FAMOTIDINE 40 MG/1
40 TABLET, FILM COATED ORAL
Refills: 0 | Status: ACTIVE | COMMUNITY
Start: 2025-01-02

## 2025-01-02 RX ORDER — METOPROLOL SUCCINATE 100 MG/1
100 TABLET, EXTENDED RELEASE ORAL
Qty: 30 | Refills: 0 | Status: ACTIVE | COMMUNITY
Start: 2025-01-02

## 2025-01-02 RX ORDER — ERGOCALCIFEROL 1.25 MG/1
1.25 MG CAPSULE, LIQUID FILLED ORAL
Refills: 0 | Status: ACTIVE | COMMUNITY
Start: 2025-01-02

## 2025-01-02 RX ORDER — GLIPIZIDE 5 MG/1
5 TABLET ORAL DAILY
Refills: 0 | Status: ACTIVE | COMMUNITY
Start: 2025-01-02

## 2025-01-02 RX ORDER — ISOSORBIDE MONONITRATE 60 MG/1
60 TABLET, EXTENDED RELEASE ORAL DAILY
Refills: 0 | Status: ACTIVE | COMMUNITY
Start: 2025-01-02

## 2025-01-02 RX ORDER — HYDRALAZINE HYDROCHLORIDE 50 MG/1
50 TABLET ORAL 3 TIMES DAILY
Qty: 270 | Refills: 3 | Status: ACTIVE | COMMUNITY
Start: 2025-01-02

## 2025-01-02 RX ORDER — APIXABAN 5 MG/1
5 TABLET, FILM COATED ORAL
Qty: 60 | Refills: 5 | Status: ACTIVE | COMMUNITY
Start: 2025-01-02

## 2025-01-02 RX ORDER — MECLIZINE HYDROCHLORIDE 25 MG/1
25 TABLET ORAL
Refills: 0 | Status: ACTIVE | COMMUNITY
Start: 2025-01-02

## 2025-01-03 ENCOUNTER — TRANSCRIPTION ENCOUNTER (OUTPATIENT)
Age: 75
End: 2025-01-03

## 2025-01-07 ENCOUNTER — TRANSCRIPTION ENCOUNTER (OUTPATIENT)
Age: 75
End: 2025-01-07

## 2025-01-12 NOTE — ED STATDOCS - NS_ ATTENDINGSCRIBEDETAILS _ED_A_ED_FT
The history, relevant review of systems, past medical and surgical history, medical decision making, and physical examination was documented by the scribe in my presence and I attest to the accuracy of the documentation.
no

## 2025-01-14 ENCOUNTER — TRANSCRIPTION ENCOUNTER (OUTPATIENT)
Age: 75
End: 2025-01-14

## 2025-01-17 ENCOUNTER — APPOINTMENT (OUTPATIENT)
Dept: AFTER HOURS CARE | Facility: EMERGENCY ROOM | Age: 75
End: 2025-01-17
Payer: MEDICARE

## 2025-01-17 DIAGNOSIS — Z76.0 ENCOUNTER FOR ISSUE OF REPEAT PRESCRIPTION: ICD-10-CM

## 2025-01-17 PROCEDURE — 99214 OFFICE O/P EST MOD 30 MIN: CPT | Mod: 2W

## 2025-01-17 RX ORDER — HYDRALAZINE HYDROCHLORIDE 50 MG/1
50 TABLET ORAL 3 TIMES DAILY
Qty: 21 | Refills: 0 | Status: ACTIVE | COMMUNITY
Start: 2025-01-17 | End: 1900-01-01

## 2025-01-17 RX ORDER — FAMOTIDINE 40 MG/1
40 TABLET, FILM COATED ORAL
Qty: 7 | Refills: 0 | Status: ACTIVE | COMMUNITY
Start: 2025-01-17 | End: 1900-01-01

## 2025-01-21 ENCOUNTER — TRANSCRIPTION ENCOUNTER (OUTPATIENT)
Age: 75
End: 2025-01-21

## 2025-01-27 ENCOUNTER — APPOINTMENT (OUTPATIENT)
Dept: CARDIOLOGY | Facility: CLINIC | Age: 75
End: 2025-01-27

## 2025-07-22 ENCOUNTER — APPOINTMENT (OUTPATIENT)
Dept: PULMONOLOGY | Facility: CLINIC | Age: 75
End: 2025-07-22
Payer: MEDICARE

## 2025-07-22 VITALS
BODY MASS INDEX: 32.43 KG/M2 | RESPIRATION RATE: 16 BRPM | DIASTOLIC BLOOD PRESSURE: 76 MMHG | HEART RATE: 67 BPM | OXYGEN SATURATION: 98 % | HEIGHT: 63 IN | SYSTOLIC BLOOD PRESSURE: 126 MMHG | WEIGHT: 183 LBS

## 2025-07-22 DIAGNOSIS — R06.02 SHORTNESS OF BREATH: ICD-10-CM

## 2025-07-22 DIAGNOSIS — R05.9 COUGH, UNSPECIFIED: ICD-10-CM

## 2025-07-22 PROCEDURE — G2211 COMPLEX E/M VISIT ADD ON: CPT

## 2025-07-22 PROCEDURE — 99204 OFFICE O/P NEW MOD 45 MIN: CPT

## 2025-07-24 ENCOUNTER — APPOINTMENT (OUTPATIENT)
Dept: PULMONOLOGY | Facility: CLINIC | Age: 75
End: 2025-07-24

## 2025-08-18 ENCOUNTER — RX CHANGE (OUTPATIENT)
Age: 75
End: 2025-08-18

## 2025-08-18 RX ORDER — BUDESONIDE AND FORMOTEROL FUMARATE DIHYDRATE 80; 4.5 UG/1; UG/1
80-4.5 AEROSOL RESPIRATORY (INHALATION) TWICE DAILY
Qty: 3 | Refills: 2 | Status: ACTIVE | COMMUNITY
Start: 1900-01-01 | End: 1900-01-01

## 2025-08-18 RX ORDER — BUDESONIDE AND FORMOTEROL FUMARATE DIHYDRATE 80; 4.5 UG/1; UG/1
80-4.5 AEROSOL RESPIRATORY (INHALATION) TWICE DAILY
Qty: 1 | Refills: 5 | Status: DISCONTINUED | COMMUNITY
Start: 2025-07-22 | End: 2025-08-18

## 2025-09-02 ENCOUNTER — APPOINTMENT (OUTPATIENT)
Dept: PULMONOLOGY | Facility: CLINIC | Age: 75
End: 2025-09-02
Payer: MEDICARE

## 2025-09-02 VITALS — HEART RATE: 70 BPM | SYSTOLIC BLOOD PRESSURE: 128 MMHG | DIASTOLIC BLOOD PRESSURE: 72 MMHG | OXYGEN SATURATION: 97 %

## 2025-09-02 VITALS — WEIGHT: 184 LBS | HEIGHT: 63.1 IN | BODY MASS INDEX: 32.6 KG/M2

## 2025-09-02 VITALS — RESPIRATION RATE: 16 BRPM

## 2025-09-02 DIAGNOSIS — R05.9 COUGH, UNSPECIFIED: ICD-10-CM

## 2025-09-02 DIAGNOSIS — R06.02 SHORTNESS OF BREATH: ICD-10-CM

## 2025-09-02 PROCEDURE — 94010 BREATHING CAPACITY TEST: CPT

## 2025-09-02 PROCEDURE — 99214 OFFICE O/P EST MOD 30 MIN: CPT | Mod: 25

## 2025-09-02 RX ORDER — CLOPIDOGREL 75 MG/1
TABLET, FILM COATED ORAL
Refills: 0 | Status: ACTIVE | COMMUNITY

## 2025-09-02 RX ORDER — HYDROCHLOROTHIAZIDE 25 MG/1
25 TABLET ORAL
Refills: 0 | Status: ACTIVE | COMMUNITY